# Patient Record
Sex: FEMALE | Race: WHITE | NOT HISPANIC OR LATINO | Employment: OTHER | URBAN - METROPOLITAN AREA
[De-identification: names, ages, dates, MRNs, and addresses within clinical notes are randomized per-mention and may not be internally consistent; named-entity substitution may affect disease eponyms.]

---

## 2015-03-02 LAB — BCS RECOMMENDATION EXT: NORMAL

## 2022-08-29 ENCOUNTER — HOSPITAL ENCOUNTER (EMERGENCY)
Facility: HOSPITAL | Age: 52
Discharge: HOME | End: 2022-08-29
Attending: EMERGENCY MEDICINE
Payer: MEDICARE

## 2022-08-29 VITALS
WEIGHT: 204 LBS | TEMPERATURE: 98 F | OXYGEN SATURATION: 97 % | BODY MASS INDEX: 30.92 KG/M2 | HEIGHT: 68 IN | RESPIRATION RATE: 16 BRPM | HEART RATE: 86 BPM | SYSTOLIC BLOOD PRESSURE: 145 MMHG | DIASTOLIC BLOOD PRESSURE: 86 MMHG

## 2022-08-29 DIAGNOSIS — G43.809 OTHER MIGRAINE WITHOUT STATUS MIGRAINOSUS, NOT INTRACTABLE: ICD-10-CM

## 2022-08-29 DIAGNOSIS — S16.1XXA CERVICAL STRAIN, ACUTE, INITIAL ENCOUNTER: ICD-10-CM
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
## 2022-08-29 PROCEDURE — 63600175 PHARM REV CODE 636 W HCPCS: Performed by: EMERGENCY MEDICINE

## 2022-08-29 PROCEDURE — 99284 EMERGENCY DEPT VISIT MOD MDM: CPT | Mod: 25

## 2022-08-29 PROCEDURE — 96374 THER/PROPH/DIAG INJ IV PUSH: CPT

## 2022-08-29 PROCEDURE — 25000003 PHARM REV CODE 250: Performed by: EMERGENCY MEDICINE

## 2022-08-29 PROCEDURE — 96375 TX/PRO/DX INJ NEW DRUG ADDON: CPT | Mod: 59

## 2022-08-29 PROCEDURE — 96361 HYDRATE IV INFUSION ADD-ON: CPT | Mod: 59

## 2022-08-29 RX ORDER — METOCLOPRAMIDE HYDROCHLORIDE 5 MG/ML
10 INJECTION INTRAMUSCULAR; INTRAVENOUS
Status: COMPLETED | OUTPATIENT
Start: 2022-08-29 | End: 2022-08-29

## 2022-08-29 RX ORDER — DIPHENHYDRAMINE HYDROCHLORIDE 50 MG/ML
25 INJECTION INTRAMUSCULAR; INTRAVENOUS
Status: COMPLETED | OUTPATIENT
Start: 2022-08-29 | End: 2022-08-29

## 2022-08-29 RX ORDER — KETOROLAC TROMETHAMINE 30 MG/ML
15 INJECTION, SOLUTION INTRAMUSCULAR; INTRAVENOUS
Status: COMPLETED | OUTPATIENT
Start: 2022-08-29 | End: 2022-08-29

## 2022-08-29 RX ADMIN — DIPHENHYDRAMINE HYDROCHLORIDE 25 MG: 50 INJECTION, SOLUTION INTRAMUSCULAR; INTRAVENOUS at 03:08

## 2022-08-29 RX ADMIN — KETOROLAC TROMETHAMINE 15 MG: 30 INJECTION, SOLUTION INTRAMUSCULAR; INTRAVENOUS at 03:08

## 2022-08-29 RX ADMIN — SODIUM CHLORIDE 1000 ML: 0.9 INJECTION, SOLUTION INTRAVENOUS at 03:08

## 2022-08-29 RX ADMIN — METOCLOPRAMIDE 10 MG: 5 INJECTION, SOLUTION INTRAMUSCULAR; INTRAVENOUS at 03:08
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
## 2022-09-17 ENCOUNTER — HOSPITAL ENCOUNTER (EMERGENCY)
Facility: HOSPITAL | Age: 52
Discharge: HOME | End: 2022-09-17
Attending: EMERGENCY MEDICINE
Payer: COMMERCIAL

## 2022-09-17 ENCOUNTER — NURSE TRIAGE (OUTPATIENT)
Dept: ADMINISTRATIVE | Facility: CLINIC | Age: 52
End: 2022-09-17
Payer: MEDICARE

## 2022-09-17 VITALS
RESPIRATION RATE: 18 BRPM | DIASTOLIC BLOOD PRESSURE: 92 MMHG | TEMPERATURE: 98 F | HEIGHT: 68 IN | WEIGHT: 200 LBS | OXYGEN SATURATION: 99 % | SYSTOLIC BLOOD PRESSURE: 175 MMHG | HEART RATE: 80 BPM | BODY MASS INDEX: 30.31 KG/M2

## 2022-09-17 DIAGNOSIS — M54.2 CERVICAL PAIN (NECK): Primary | ICD-10-CM

## 2022-09-17 PROCEDURE — 96372 THER/PROPH/DIAG INJ SC/IM: CPT | Performed by: NURSE PRACTITIONER

## 2022-09-17 PROCEDURE — 63600175 PHARM REV CODE 636 W HCPCS: Performed by: NURSE PRACTITIONER

## 2022-09-17 PROCEDURE — 99284 EMERGENCY DEPT VISIT MOD MDM: CPT | Mod: 25

## 2022-09-17 RX ORDER — KETOROLAC TROMETHAMINE 30 MG/ML
15 INJECTION, SOLUTION INTRAMUSCULAR; INTRAVENOUS
Status: COMPLETED | OUTPATIENT
Start: 2022-09-17 | End: 2022-09-17

## 2022-09-17 RX ORDER — CYCLOBENZAPRINE HCL 10 MG
10 TABLET ORAL 3 TIMES DAILY PRN
Qty: 15 TABLET | Refills: 0 | Status: SHIPPED | OUTPATIENT
Start: 2022-09-17 | End: 2022-09-22

## 2022-09-17 RX ORDER — SULINDAC 150 MG/1
150 TABLET ORAL 2 TIMES DAILY
Qty: 10 TABLET | Refills: 0 | Status: SHIPPED | OUTPATIENT
Start: 2022-09-17 | End: 2022-09-22

## 2022-09-17 RX ORDER — METHYLPREDNISOLONE SOD SUCC 125 MG
125 VIAL (EA) INJECTION
Status: COMPLETED | OUTPATIENT
Start: 2022-09-17 | End: 2022-09-17

## 2022-09-17 RX ADMIN — KETOROLAC TROMETHAMINE 15 MG: 30 INJECTION, SOLUTION INTRAMUSCULAR; INTRAVENOUS at 08:09

## 2022-09-17 RX ADMIN — METHYLPREDNISOLONE SODIUM SUCCINATE 125 MG: 125 INJECTION, POWDER, FOR SOLUTION INTRAMUSCULAR; INTRAVENOUS at 08:09

## 2022-09-18 ENCOUNTER — HOSPITAL ENCOUNTER (EMERGENCY)
Facility: HOSPITAL | Age: 52
Discharge: HOME | End: 2022-09-18
Attending: EMERGENCY MEDICINE
Payer: COMMERCIAL

## 2022-09-18 VITALS
DIASTOLIC BLOOD PRESSURE: 94 MMHG | RESPIRATION RATE: 18 BRPM | BODY MASS INDEX: 30.31 KG/M2 | SYSTOLIC BLOOD PRESSURE: 169 MMHG | HEART RATE: 91 BPM | WEIGHT: 200 LBS | TEMPERATURE: 98 F | HEIGHT: 68 IN | OXYGEN SATURATION: 98 %

## 2022-09-18 DIAGNOSIS — M62.838 NECK MUSCLE SPASM: Primary | ICD-10-CM

## 2022-09-18 DIAGNOSIS — M54.2 POSTERIOR NECK PAIN: ICD-10-CM

## 2022-09-18 DIAGNOSIS — S13.4XXA WHIPLASH INJURY TO NECK, INITIAL ENCOUNTER: ICD-10-CM

## 2022-09-18 PROCEDURE — 63600175 PHARM REV CODE 636 W HCPCS: Performed by: EMERGENCY MEDICINE

## 2022-09-18 PROCEDURE — 25000003 PHARM REV CODE 250: Performed by: EMERGENCY MEDICINE

## 2022-09-18 PROCEDURE — 99285 EMERGENCY DEPT VISIT HI MDM: CPT | Mod: 25

## 2022-09-18 PROCEDURE — 96372 THER/PROPH/DIAG INJ SC/IM: CPT | Performed by: EMERGENCY MEDICINE

## 2022-09-18 RX ORDER — DROPERIDOL 2.5 MG/ML
2.5 INJECTION, SOLUTION INTRAMUSCULAR; INTRAVENOUS
Status: COMPLETED | OUTPATIENT
Start: 2022-09-18 | End: 2022-09-18

## 2022-09-18 RX ORDER — KETOROLAC TROMETHAMINE 30 MG/ML
30 INJECTION, SOLUTION INTRAMUSCULAR; INTRAVENOUS
Status: COMPLETED | OUTPATIENT
Start: 2022-09-18 | End: 2022-09-18

## 2022-09-18 RX ORDER — DIAZEPAM 10 MG/2ML
5 INJECTION INTRAMUSCULAR
Status: COMPLETED | OUTPATIENT
Start: 2022-09-18 | End: 2022-09-18

## 2022-09-18 RX ORDER — METHOCARBAMOL 500 MG/1
1500 TABLET, FILM COATED ORAL
Status: COMPLETED | OUTPATIENT
Start: 2022-09-18 | End: 2022-09-18

## 2022-09-18 RX ORDER — DIPHENHYDRAMINE HYDROCHLORIDE 50 MG/ML
50 INJECTION INTRAMUSCULAR; INTRAVENOUS
Status: COMPLETED | OUTPATIENT
Start: 2022-09-18 | End: 2022-09-18

## 2022-09-18 RX ORDER — MORPHINE SULFATE 4 MG/ML
8 INJECTION, SOLUTION INTRAMUSCULAR; INTRAVENOUS
Status: COMPLETED | OUTPATIENT
Start: 2022-09-18 | End: 2022-09-18

## 2022-09-18 RX ORDER — CARISOPRODOL 350 MG/1
350 TABLET ORAL
Status: DISCONTINUED | OUTPATIENT
Start: 2022-09-18 | End: 2022-09-18

## 2022-09-18 RX ORDER — CARISOPRODOL 350 MG/1
350 TABLET ORAL 4 TIMES DAILY PRN
Qty: 20 TABLET | Refills: 0 | Status: SHIPPED | OUTPATIENT
Start: 2022-09-18 | End: 2022-09-26 | Stop reason: ALTCHOICE

## 2022-09-18 RX ADMIN — DIPHENHYDRAMINE HYDROCHLORIDE 50 MG: 50 INJECTION, SOLUTION INTRAMUSCULAR; INTRAVENOUS at 03:09

## 2022-09-18 RX ADMIN — DIAZEPAM 5 MG: 5 INJECTION, SOLUTION INTRAMUSCULAR; INTRAVENOUS at 02:09

## 2022-09-18 RX ADMIN — MORPHINE SULFATE 8 MG: 4 INJECTION INTRAVENOUS at 02:09

## 2022-09-18 RX ADMIN — DROPERIDOL 2.5 MG: 2.5 INJECTION, SOLUTION INTRAMUSCULAR; INTRAVENOUS at 04:09

## 2022-09-18 RX ADMIN — KETOROLAC TROMETHAMINE 30 MG: 30 INJECTION, SOLUTION INTRAMUSCULAR; INTRAVENOUS at 02:09

## 2022-09-18 RX ADMIN — METHOCARBAMOL 1500 MG: 500 TABLET ORAL at 04:09
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
## 2022-09-19 ENCOUNTER — TELEPHONE (OUTPATIENT)
Dept: NEUROSURGERY | Facility: CLINIC | Age: 52
End: 2022-09-19
Payer: MEDICARE
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
## 2022-09-21 ENCOUNTER — TELEPHONE (OUTPATIENT)
Dept: FAMILY MEDICINE | Facility: CLINIC | Age: 52
End: 2022-09-21
Payer: MEDICARE
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
## 2022-09-26 ENCOUNTER — OFFICE VISIT (OUTPATIENT)
Dept: FAMILY MEDICINE | Facility: CLINIC | Age: 52
End: 2022-09-26
Payer: MEDICARE

## 2022-09-26 VITALS
BODY MASS INDEX: 31 KG/M2 | SYSTOLIC BLOOD PRESSURE: 134 MMHG | WEIGHT: 204.56 LBS | HEIGHT: 68 IN | TEMPERATURE: 99 F | HEART RATE: 83 BPM | DIASTOLIC BLOOD PRESSURE: 80 MMHG | OXYGEN SATURATION: 98 %

## 2022-09-26 DIAGNOSIS — R73.03 PREDIABETES: ICD-10-CM
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
## 2022-09-26 DIAGNOSIS — Z78.0 MENOPAUSE PRESENT: ICD-10-CM

## 2022-09-26 DIAGNOSIS — Z00.00 ANNUAL PHYSICAL EXAM: Primary | ICD-10-CM

## 2022-09-26 DIAGNOSIS — S13.4XXS WHIPLASH INJURY TO NECK, SEQUELA: ICD-10-CM

## 2022-09-26 DIAGNOSIS — E66.9 OBESITY, UNSPECIFIED CLASSIFICATION, UNSPECIFIED OBESITY TYPE, UNSPECIFIED WHETHER SERIOUS COMORBIDITY PRESENT: ICD-10-CM

## 2022-09-26 PROBLEM — F41.9 ANXIETY: Status: ACTIVE | Noted: 2021-03-23

## 2022-09-26 PROBLEM — K50.90 CROHN'S DISEASE: Status: ACTIVE | Noted: 2021-02-01

## 2022-09-26 PROBLEM — G89.29 CHRONIC LOW BACK PAIN: Status: ACTIVE | Noted: 2021-04-27

## 2022-09-26 PROBLEM — M62.830 SPASM OF BACK MUSCLES: Status: ACTIVE | Noted: 2021-04-27

## 2022-09-26 PROBLEM — F51.01 PRIMARY INSOMNIA: Status: ACTIVE | Noted: 2021-02-01

## 2022-09-26 PROBLEM — G43.909 MIGRAINE: Status: ACTIVE | Noted: 2022-09-26

## 2022-09-26 PROBLEM — J45.909 ASTHMA: Status: ACTIVE | Noted: 2021-03-23

## 2022-09-26 PROBLEM — N39.3 FEMALE STRESS INCONTINENCE: Status: ACTIVE | Noted: 2021-02-01

## 2022-09-26 PROBLEM — K58.9 IRRITABLE BOWEL SYNDROME: Status: ACTIVE | Noted: 2021-04-27

## 2022-09-26 PROBLEM — E03.9 ACQUIRED HYPOTHYROIDISM: Status: ACTIVE | Noted: 2021-04-07

## 2022-09-26 PROBLEM — Z98.890 HISTORY OF CERVICAL DISCECTOMY: Status: ACTIVE | Noted: 2021-04-27

## 2022-09-26 PROBLEM — I10 HYPERTENSIVE DISORDER: Status: ACTIVE | Noted: 2021-03-23

## 2022-09-26 PROBLEM — Z98.1 HISTORY OF SPINAL FUSION: Status: ACTIVE | Noted: 2021-04-07

## 2022-09-26 PROBLEM — M54.50 CHRONIC LOW BACK PAIN: Status: ACTIVE | Noted: 2021-04-27

## 2022-09-26 PROCEDURE — 3079F PR MOST RECENT DIASTOLIC BLOOD PRESSURE 80-89 MM HG: ICD-10-PCS | Mod: CPTII,S$GLB,, | Performed by: FAMILY MEDICINE

## 2022-09-26 PROCEDURE — 99999 PR PBB SHADOW E&M-EST. PATIENT-LVL III: CPT | Mod: PBBFAC,,, | Performed by: FAMILY MEDICINE

## 2022-09-26 PROCEDURE — 99999 PR PBB SHADOW E&M-EST. PATIENT-LVL III: ICD-10-PCS | Mod: PBBFAC,,, | Performed by: FAMILY MEDICINE

## 2022-09-26 PROCEDURE — 3008F PR BODY MASS INDEX (BMI) DOCUMENTED: ICD-10-PCS | Mod: CPTII,S$GLB,, | Performed by: FAMILY MEDICINE

## 2022-09-26 PROCEDURE — 99386 PREV VISIT NEW AGE 40-64: CPT | Mod: S$GLB,,, | Performed by: FAMILY MEDICINE

## 2022-09-26 PROCEDURE — 1159F PR MEDICATION LIST DOCUMENTED IN MEDICAL RECORD: ICD-10-PCS | Mod: CPTII,S$GLB,, | Performed by: FAMILY MEDICINE

## 2022-09-26 PROCEDURE — 3075F SYST BP GE 130 - 139MM HG: CPT | Mod: CPTII,S$GLB,, | Performed by: FAMILY MEDICINE

## 2022-09-26 PROCEDURE — 1159F MED LIST DOCD IN RCRD: CPT | Mod: CPTII,S$GLB,, | Performed by: FAMILY MEDICINE

## 2022-09-26 PROCEDURE — 3008F BODY MASS INDEX DOCD: CPT | Mod: CPTII,S$GLB,, | Performed by: FAMILY MEDICINE

## 2022-09-26 PROCEDURE — 3075F PR MOST RECENT SYSTOLIC BLOOD PRESS GE 130-139MM HG: ICD-10-PCS | Mod: CPTII,S$GLB,, | Performed by: FAMILY MEDICINE

## 2022-09-26 PROCEDURE — 99386 PR PREVENTIVE VISIT,NEW,40-64: ICD-10-PCS | Mod: S$GLB,,, | Performed by: FAMILY MEDICINE

## 2022-09-26 PROCEDURE — 3079F DIAST BP 80-89 MM HG: CPT | Mod: CPTII,S$GLB,, | Performed by: FAMILY MEDICINE

## 2022-09-26 RX ORDER — TIRZEPATIDE 2.5 MG/.5ML
2.5 INJECTION, SOLUTION SUBCUTANEOUS
Qty: 4 PEN | Refills: 11 | Status: SHIPPED | OUTPATIENT
Start: 2022-09-26 | End: 2022-10-19

## 2022-09-26 RX ORDER — TIRZEPATIDE 2.5 MG/.5ML
2.5 INJECTION, SOLUTION SUBCUTANEOUS
Qty: 4 PEN | Refills: 11 | Status: SHIPPED | OUTPATIENT
Start: 2022-09-26 | End: 2022-09-26 | Stop reason: SDUPTHER

## 2022-09-26 RX ORDER — CARISOPRODOL 350 MG/1
TABLET ORAL
COMMUNITY
End: 2022-10-19 | Stop reason: SDUPTHER
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
## 2022-09-27 DIAGNOSIS — I10 HYPERTENSIVE DISORDER: ICD-10-CM

## 2022-10-11 ENCOUNTER — TELEPHONE (OUTPATIENT)
Dept: FAMILY MEDICINE | Facility: CLINIC | Age: 52
End: 2022-10-11
Payer: COMMERCIAL

## 2022-10-11 DIAGNOSIS — T81.31XS DEHISCENCE OF OPERATIVE WOUND, SEQUELA: Primary | ICD-10-CM

## 2022-10-11 NOTE — TELEPHONE ENCOUNTER
----- Message from Keyona Gretta sent at 10/11/2022 11:34 AM CDT -----  Regarding: self .[phone]  .Type:  Patient Requesting Referral    Who Called: self     Referral to What Specialty: Plastic Surgeon     Reason for Referral: infected open wound on  R breast   Does the patient want the referral with a specific physician?   Vinnie Aly MD, FACS  [address], [place] [address] [phone] Fax# [phone]    [facility] the specialist [facility]? [facility]     Would the patient rather a call back or a response via [facility]? Call back     Best Call Back Number: .[phone]      Additional Information:  Please send asap so pt can be seen today

## 2022-10-13 ENCOUNTER — TELEPHONE (OUTPATIENT)
Dept: FAMILY MEDICINE | Facility: CLINIC | Age: 52
End: 2022-10-13
Payer: COMMERCIAL

## 2022-10-13 NOTE — TELEPHONE ENCOUNTER
----- Message from Karley Gorman sent at 10/13/2022  2:40 PM CDT -----  .Type:  Patient Returning Call    Who Called: self    Who Left Message for Patient: teetee    Does the patient know what this is regarding?:referral    Would the patient rather a call back or a response via [facility]? call    Best Call Back Number:.[phone]    Pt states wrong referral was given

## 2022-10-13 NOTE — TELEPHONE ENCOUNTER
----- Message from Veronica Storm sent at 10/13/2022 11:59 AM CDT -----  Type: Patient Call Back    Who called:pt     What is the request in detail:pt requesting to get referral to see general surgery for open wound on her breasts. Call pt     Can the clinic reply by [facility]?    Would the patient rather a call back or a response via [facility]? call    Best call back number:[phone]  Additional Information:

## 2022-10-14 DIAGNOSIS — N61.1 BREAST ABSCESS: Primary | ICD-10-CM

## 2022-10-18 ENCOUNTER — TELEPHONE (OUTPATIENT)
Dept: FAMILY MEDICINE | Facility: CLINIC | Age: 52
End: 2022-10-18
Payer: COMMERCIAL

## 2022-10-18 NOTE — LETTER
October 18, 2022    Janneth Woods  [address]  [place] [address]             [id number] [facility] [address], [facility] [address]  [place] [address]  Phone: [phone]  Fax: [phone] Dear Ms. Woods:    Sorry we were unable to contact you to schedule your Plastic Surgery appointment. Please give the department a call at [phone].        If you have any questions or concerns, please don't hesitate to call.    Sincerely,        Joellen Howell,PSA

## 2022-10-19 ENCOUNTER — OFFICE VISIT (OUTPATIENT)
Dept: FAMILY MEDICINE | Facility: CLINIC | Age: 52
End: 2022-10-19
Payer: MEDICARE

## 2022-10-19 VITALS — WEIGHT: 200.63 LBS | BODY MASS INDEX: 31.49 KG/M2 | HEIGHT: 67 IN

## 2022-10-19 DIAGNOSIS — E66.9 OBESITY, UNSPECIFIED CLASSIFICATION, UNSPECIFIED OBESITY TYPE, UNSPECIFIED WHETHER SERIOUS COMORBIDITY PRESENT: ICD-10-CM

## 2022-10-19 DIAGNOSIS — F51.01 PRIMARY INSOMNIA: ICD-10-CM

## 2022-10-19 DIAGNOSIS — M54.50 CHRONIC BILATERAL LOW BACK PAIN, UNSPECIFIED WHETHER SCIATICA PRESENT: ICD-10-CM

## 2022-10-19 DIAGNOSIS — Z00.00 ANNUAL PHYSICAL EXAM: Primary | ICD-10-CM

## 2022-10-19 DIAGNOSIS — N61.1 BREAST ABSCESS: ICD-10-CM

## 2022-10-19 DIAGNOSIS — R79.9 ABNORMAL FINDING OF BLOOD CHEMISTRY: ICD-10-CM

## 2022-10-19 DIAGNOSIS — R93.89 ABNORMAL FINDINGS ON DIAGNOSTIC IMAGING OF OTHER SPECIFIED BODY STRUCTURES: ICD-10-CM

## 2022-10-19 DIAGNOSIS — G89.29 CHRONIC BILATERAL LOW BACK PAIN, UNSPECIFIED WHETHER SCIATICA PRESENT: ICD-10-CM

## 2022-10-19 DIAGNOSIS — R73.03 PREDIABETES: ICD-10-CM

## 2022-10-19 PROCEDURE — 3008F PR BODY MASS INDEX (BMI) DOCUMENTED: ICD-10-PCS | Mod: CPTII,S$GLB,, | Performed by: FAMILY MEDICINE

## 2022-10-19 PROCEDURE — 3044F PR MOST RECENT HEMOGLOBIN A1C LEVEL <7.0%: ICD-10-PCS | Mod: CPTII,S$GLB,, | Performed by: FAMILY MEDICINE

## 2022-10-19 PROCEDURE — 99999 PR PBB SHADOW E&M-EST. PATIENT-LVL II: ICD-10-PCS | Mod: PBBFAC,,, | Performed by: FAMILY MEDICINE

## 2022-10-19 PROCEDURE — 99999 PR PBB SHADOW E&M-EST. PATIENT-LVL II: CPT | Mod: PBBFAC,,, | Performed by: FAMILY MEDICINE

## 2022-10-19 PROCEDURE — 4010F ACE/ARB THERAPY RXD/TAKEN: CPT | Mod: CPTII,S$GLB,, | Performed by: FAMILY MEDICINE

## 2022-10-19 PROCEDURE — 3061F PR NEG MICROALBUMINURIA RESULT DOCUMENTED/REVIEW: ICD-10-PCS | Mod: CPTII,S$GLB,, | Performed by: FAMILY MEDICINE

## 2022-10-19 PROCEDURE — 4010F PR ACE/ARB THEARPY RXD/TAKEN: ICD-10-PCS | Mod: CPTII,S$GLB,, | Performed by: FAMILY MEDICINE

## 2022-10-19 PROCEDURE — 99214 OFFICE O/P EST MOD 30 MIN: CPT | Mod: S$GLB,,, | Performed by: FAMILY MEDICINE

## 2022-10-19 PROCEDURE — 3044F HG A1C LEVEL LT 7.0%: CPT | Mod: CPTII,S$GLB,, | Performed by: FAMILY MEDICINE

## 2022-10-19 PROCEDURE — 3066F PR DOCUMENTATION OF TREATMENT FOR NEPHROPATHY: ICD-10-PCS | Mod: CPTII,S$GLB,, | Performed by: FAMILY MEDICINE

## 2022-10-19 PROCEDURE — 3066F NEPHROPATHY DOC TX: CPT | Mod: CPTII,S$GLB,, | Performed by: FAMILY MEDICINE

## 2022-10-19 PROCEDURE — 99214 PR OFFICE/OUTPT VISIT, EST, LEVL IV, 30-39 MIN: ICD-10-PCS | Mod: S$GLB,,, | Performed by: FAMILY MEDICINE

## 2022-10-19 PROCEDURE — 3008F BODY MASS INDEX DOCD: CPT | Mod: CPTII,S$GLB,, | Performed by: FAMILY MEDICINE

## 2022-10-19 PROCEDURE — 3061F NEG MICROALBUMINURIA REV: CPT | Mod: CPTII,S$GLB,, | Performed by: FAMILY MEDICINE

## 2022-10-19 RX ORDER — TIRZEPATIDE 5 MG/.5ML
5 INJECTION, SOLUTION SUBCUTANEOUS
Qty: 4 PEN | Refills: 11 | Status: SHIPPED | OUTPATIENT
Start: 2022-10-19 | End: 2022-11-10 | Stop reason: RX

## 2022-10-19 RX ORDER — CARISOPRODOL 350 MG/1
TABLET ORAL
Qty: 20 TABLET | Refills: 0 | Status: SHIPPED | OUTPATIENT
Start: 2022-10-19 | End: 2022-12-15

## 2022-10-19 RX ORDER — ZOLPIDEM TARTRATE 12.5 MG/1
12.5 TABLET, FILM COATED, EXTENDED RELEASE ORAL NIGHTLY PRN
Qty: 30 TABLET | Refills: 5 | Status: SHIPPED | OUTPATIENT
Start: 2022-10-19 | End: 2023-04-19

## 2022-10-19 NOTE — PROGRESS NOTES
"HISTORY OF PRESENT ILLNESS:  Janneth Woods is a 52 y.o. female who presents to the clinic today for Follow-up  .       She is back on medication and doing better overall.  She is working on her weight as well.  Start on the GLP 1 shots    Patient Active Problem List   Diagnosis    Acquired hypothyroidism    Anxiety    Chronic low back pain    History of cervical discectomy    History of spinal fusion    Hypertensive disorder    Menopause present    Irritable bowel syndrome    Crohn's disease    Female stress incontinence    Asthma    Migraine    Spasm of back muscles    Obesity    Primary insomnia    Prediabetes           CARE TEAM:  Patient Care Team:  Merrill Cummins MD as PCP - General (Family Medicine)         ROS        PHYSICAL EXAM:   Ht 5' 7" (1.702 m)   Wt 91 kg (200 lb 9.9 oz)   BMI 31.42 kg/m²   BP Readings from Last 5 Encounters:   09/26/22 134/80   09/18/22 (!) 169/94   09/17/22 (!) 175/92   08/29/22 (!) 145/86     Wt Readings from Last 5 Encounters:   10/19/22 91 kg (200 lb 9.9 oz)   09/26/22 92.8 kg (204 lb 9.4 oz)   09/18/22 90.7 kg (200 lb)   09/17/22 90.7 kg (200 lb)   08/29/22 92.5 kg (204 lb)             She appears well, in no apparent distress.  Alert and oriented times three, pleasant and cooperative. Vital signs are as documented in vital signs section.       Medication List with Changes/Refills   New Medications    ESTRADIOL (ESTRACE) 1 MG TABLET    Take 1 tablet (1 mg total) by mouth once daily.    ESTROGENS, CONJUGATED, (PREMARIN) 0.3 MG TABLET    Take 1 tablet (0.3 mg total) by mouth once daily.    TIRZEPATIDE (MOUNJARO) 7.5 MG/0.5 ML PNIJ    Inject 7.5 mg into the skin every 7 days.    ZOLPIDEM (AMBIEN CR) 12.5 MG CR TABLET    Take 1 tablet (12.5 mg total) by mouth nightly as needed for Insomnia.   Current Medications    ESTROGENS, CONJUGATED, (PREMARIN) 0.625 MG TABLET    Take 1 tablet (0.625 mg total) by mouth once daily.   Changed and/or Refilled Medications    Modified Medication " Previous Medication    CARISOPRODOL (SOMA) 350 MG TABLET carisoprodoL (SOMA) 350 MG tablet       carisoprodol 350 mg tablet  TAKE ONE TABLET BY MOUTH ONE TIME DAILY, AS NEEDED FOR 30 DAYS Strength: 350 mg    carisoprodol 350 mg tablet   TAKE ONE TABLET BY MOUTH ONE TIME DAILY, AS NEEDED FOR 30 DAYS   Discontinued Medications    TIRZEPATIDE (MOUNJARO) 2.5 MG/0.5 ML PNIJ    Inject 2.5 mg into the skin every 7 days.    TIRZEPATIDE (MOUNJARO) 5 MG/0.5 ML PNIJ    Inject 5 mg into the skin every 7 days.         ASSESSMENT AND PLAN:    Problem List Items Addressed This Visit       Chronic low back pain    Relevant Medications    carisoprodoL (SOMA) 350 MG tablet    Other Relevant Orders    Ambulatory referral/consult to Pain Clinic    Obesity    Relevant Medications    estrogens, conjugated, (PREMARIN) 0.3 MG tablet    Primary insomnia    Relevant Medications    zolpidem (AMBIEN CR) 12.5 MG CR tablet    Prediabetes     Other Visit Diagnoses       Annual physical exam    -  Primary    Relevant Orders    C. trachomatis/N. gonorrhoeae by AMP DNA    Comprehensive Metabolic Panel    Breast abscess        Relevant Medications    estrogens, conjugated, (PREMARIN) 0.3 MG tablet    Abnormal finding of blood chemistry        Relevant Medications    estrogens, conjugated, (PREMARIN) 0.3 MG tablet    zolpidem (AMBIEN CR) 12.5 MG CR tablet    carisoprodoL (SOMA) 350 MG tablet    Other Relevant Orders    C. trachomatis/N. gonorrhoeae by AMP DNA    Comprehensive Metabolic Panel    Ambulatory referral/consult to Pain Clinic    Abnormal findings on diagnostic imaging of other specified body structures              The current medical regimen is effective;  continue present plan and medications.  Switch to long acting ambien  We will go from there      No future appointments.    No follow-ups on file. or sooner as needed.

## 2022-10-20 RX ORDER — ESTRADIOL 1 MG/1
1 TABLET ORAL DAILY
Qty: 30 TABLET | Refills: 11 | Status: SHIPPED | OUTPATIENT
Start: 2022-10-20 | End: 2023-05-10

## 2022-10-21 DIAGNOSIS — R79.9 ABNORMAL FINDING OF BLOOD CHEMISTRY: ICD-10-CM

## 2022-10-21 DIAGNOSIS — R73.03 PREDIABETES: ICD-10-CM

## 2022-10-25 ENCOUNTER — TELEPHONE (OUTPATIENT)
Dept: NEUROSURGERY | Facility: CLINIC | Age: 52
End: 2022-10-25
Payer: COMMERCIAL

## 2022-10-25 NOTE — TELEPHONE ENCOUNTER
Pt was trying to get on the schedule for  but does not have a recent MRI order in the system. I stated to pt that she will need to see his PA first before seeing him. Pt stated she prefers the [facility] clinic. I scheduled pt for Thursday 10/27 at 10:00 am with Eliz. Pt voiced understanding.

## 2022-10-26 ENCOUNTER — TELEPHONE (OUTPATIENT)
Dept: NEUROSURGERY | Facility: CLINIC | Age: 52
End: 2022-10-26
Payer: COMMERCIAL

## 2022-11-03 ENCOUNTER — PATIENT MESSAGE (OUTPATIENT)
Dept: ADMINISTRATIVE | Facility: HOSPITAL | Age: 52
End: 2022-11-03
Payer: COMMERCIAL

## 2022-11-03 DIAGNOSIS — Z12.11 SCREENING FOR COLON CANCER: ICD-10-CM

## 2022-11-10 RX ORDER — TIRZEPATIDE 7.5 MG/.5ML
7.5 INJECTION, SOLUTION SUBCUTANEOUS
Qty: 4 PEN | Refills: 11 | Status: SHIPPED | OUTPATIENT
Start: 2022-11-10 | End: 2023-01-09 | Stop reason: SDUPTHER

## 2022-12-15 ENCOUNTER — OFFICE VISIT (OUTPATIENT)
Dept: NEUROSURGERY | Facility: CLINIC | Age: 52
End: 2022-12-15
Payer: COMMERCIAL

## 2022-12-15 VITALS
BODY MASS INDEX: 29.83 KG/M2 | WEIGHT: 190.5 LBS | SYSTOLIC BLOOD PRESSURE: 148 MMHG | DIASTOLIC BLOOD PRESSURE: 82 MMHG | HEART RATE: 72 BPM

## 2022-12-15 DIAGNOSIS — M54.2 POSTERIOR NECK PAIN: ICD-10-CM

## 2022-12-15 DIAGNOSIS — Z98.1 S/P CERVICAL SPINAL FUSION: Primary | ICD-10-CM

## 2022-12-15 PROCEDURE — 1159F MED LIST DOCD IN RCRD: CPT | Mod: CPTII,S$GLB,, | Performed by: PHYSICIAN ASSISTANT

## 2022-12-15 PROCEDURE — 3008F BODY MASS INDEX DOCD: CPT | Mod: CPTII,S$GLB,, | Performed by: PHYSICIAN ASSISTANT

## 2022-12-15 PROCEDURE — 3079F PR MOST RECENT DIASTOLIC BLOOD PRESSURE 80-89 MM HG: ICD-10-PCS | Mod: CPTII,S$GLB,, | Performed by: PHYSICIAN ASSISTANT

## 2022-12-15 PROCEDURE — 1160F PR REVIEW ALL MEDS BY PRESCRIBER/CLIN PHARMACIST DOCUMENTED: ICD-10-PCS | Mod: CPTII,S$GLB,, | Performed by: PHYSICIAN ASSISTANT

## 2022-12-15 PROCEDURE — 99204 OFFICE O/P NEW MOD 45 MIN: CPT | Mod: S$GLB,,, | Performed by: PHYSICIAN ASSISTANT

## 2022-12-15 PROCEDURE — 3044F HG A1C LEVEL LT 7.0%: CPT | Mod: CPTII,S$GLB,, | Performed by: PHYSICIAN ASSISTANT

## 2022-12-15 PROCEDURE — 3077F SYST BP >= 140 MM HG: CPT | Mod: CPTII,S$GLB,, | Performed by: PHYSICIAN ASSISTANT

## 2022-12-15 PROCEDURE — 4010F ACE/ARB THERAPY RXD/TAKEN: CPT | Mod: CPTII,S$GLB,, | Performed by: PHYSICIAN ASSISTANT

## 2022-12-15 PROCEDURE — 3077F PR MOST RECENT SYSTOLIC BLOOD PRESSURE >= 140 MM HG: ICD-10-PCS | Mod: CPTII,S$GLB,, | Performed by: PHYSICIAN ASSISTANT

## 2022-12-15 PROCEDURE — 3044F PR MOST RECENT HEMOGLOBIN A1C LEVEL <7.0%: ICD-10-PCS | Mod: CPTII,S$GLB,, | Performed by: PHYSICIAN ASSISTANT

## 2022-12-15 PROCEDURE — 3079F DIAST BP 80-89 MM HG: CPT | Mod: CPTII,S$GLB,, | Performed by: PHYSICIAN ASSISTANT

## 2022-12-15 PROCEDURE — 99999 PR PBB SHADOW E&M-EST. PATIENT-LVL III: CPT | Mod: PBBFAC,,, | Performed by: PHYSICIAN ASSISTANT

## 2022-12-15 PROCEDURE — 3066F NEPHROPATHY DOC TX: CPT | Mod: CPTII,S$GLB,, | Performed by: PHYSICIAN ASSISTANT

## 2022-12-15 PROCEDURE — 99204 PR OFFICE/OUTPT VISIT, NEW, LEVL IV, 45-59 MIN: ICD-10-PCS | Mod: S$GLB,,, | Performed by: PHYSICIAN ASSISTANT

## 2022-12-15 PROCEDURE — 3061F NEG MICROALBUMINURIA REV: CPT | Mod: CPTII,S$GLB,, | Performed by: PHYSICIAN ASSISTANT

## 2022-12-15 PROCEDURE — 3008F PR BODY MASS INDEX (BMI) DOCUMENTED: ICD-10-PCS | Mod: CPTII,S$GLB,, | Performed by: PHYSICIAN ASSISTANT

## 2022-12-15 PROCEDURE — 99999 PR PBB SHADOW E&M-EST. PATIENT-LVL III: ICD-10-PCS | Mod: PBBFAC,,, | Performed by: PHYSICIAN ASSISTANT

## 2022-12-15 PROCEDURE — 3066F PR DOCUMENTATION OF TREATMENT FOR NEPHROPATHY: ICD-10-PCS | Mod: CPTII,S$GLB,, | Performed by: PHYSICIAN ASSISTANT

## 2022-12-15 PROCEDURE — 1159F PR MEDICATION LIST DOCUMENTED IN MEDICAL RECORD: ICD-10-PCS | Mod: CPTII,S$GLB,, | Performed by: PHYSICIAN ASSISTANT

## 2022-12-15 PROCEDURE — 3061F PR NEG MICROALBUMINURIA RESULT DOCUMENTED/REVIEW: ICD-10-PCS | Mod: CPTII,S$GLB,, | Performed by: PHYSICIAN ASSISTANT

## 2022-12-15 PROCEDURE — 1160F RVW MEDS BY RX/DR IN RCRD: CPT | Mod: CPTII,S$GLB,, | Performed by: PHYSICIAN ASSISTANT

## 2022-12-15 PROCEDURE — 4010F PR ACE/ARB THEARPY RXD/TAKEN: ICD-10-PCS | Mod: CPTII,S$GLB,, | Performed by: PHYSICIAN ASSISTANT
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
## 2022-12-21 ENCOUNTER — OFFICE VISIT (OUTPATIENT)
Dept: FAMILY MEDICINE | Facility: CLINIC | Age: 52
End: 2022-12-21
Payer: COMMERCIAL

## 2022-12-21 VITALS — BODY MASS INDEX: 29.1 KG/M2 | HEIGHT: 67 IN | WEIGHT: 185.44 LBS

## 2022-12-21 DIAGNOSIS — M79.644 FINGER PAIN, RIGHT: Primary | ICD-10-CM

## 2022-12-21 DIAGNOSIS — R73.03 PREDIABETES: ICD-10-CM

## 2022-12-21 DIAGNOSIS — R11.0 NAUSEA: ICD-10-CM

## 2022-12-21 DIAGNOSIS — K59.04 CHRONIC IDIOPATHIC CONSTIPATION: ICD-10-CM

## 2022-12-21 DIAGNOSIS — R79.9 ABNORMAL FINDING OF BLOOD CHEMISTRY: ICD-10-CM

## 2022-12-21 DIAGNOSIS — I10 HYPERTENSION, UNSPECIFIED TYPE: ICD-10-CM

## 2022-12-21 PROCEDURE — 1159F MED LIST DOCD IN RCRD: CPT | Mod: CPTII,S$GLB,, | Performed by: FAMILY MEDICINE

## 2022-12-21 PROCEDURE — 1160F RVW MEDS BY RX/DR IN RCRD: CPT | Mod: CPTII,S$GLB,, | Performed by: FAMILY MEDICINE

## 2022-12-21 PROCEDURE — 3061F PR NEG MICROALBUMINURIA RESULT DOCUMENTED/REVIEW: ICD-10-PCS | Mod: CPTII,S$GLB,, | Performed by: FAMILY MEDICINE

## 2022-12-21 PROCEDURE — 1159F PR MEDICATION LIST DOCUMENTED IN MEDICAL RECORD: ICD-10-PCS | Mod: CPTII,S$GLB,, | Performed by: FAMILY MEDICINE

## 2022-12-21 PROCEDURE — 99214 PR OFFICE/OUTPT VISIT, EST, LEVL IV, 30-39 MIN: ICD-10-PCS | Mod: S$GLB,,, | Performed by: FAMILY MEDICINE

## 2022-12-21 PROCEDURE — 1160F PR REVIEW ALL MEDS BY PRESCRIBER/CLIN PHARMACIST DOCUMENTED: ICD-10-PCS | Mod: CPTII,S$GLB,, | Performed by: FAMILY MEDICINE

## 2022-12-21 PROCEDURE — 3066F PR DOCUMENTATION OF TREATMENT FOR NEPHROPATHY: ICD-10-PCS | Mod: CPTII,S$GLB,, | Performed by: FAMILY MEDICINE

## 2022-12-21 PROCEDURE — 3008F BODY MASS INDEX DOCD: CPT | Mod: CPTII,S$GLB,, | Performed by: FAMILY MEDICINE

## 2022-12-21 PROCEDURE — 3008F PR BODY MASS INDEX (BMI) DOCUMENTED: ICD-10-PCS | Mod: CPTII,S$GLB,, | Performed by: FAMILY MEDICINE

## 2022-12-21 PROCEDURE — 3061F NEG MICROALBUMINURIA REV: CPT | Mod: CPTII,S$GLB,, | Performed by: FAMILY MEDICINE

## 2022-12-21 PROCEDURE — 3044F PR MOST RECENT HEMOGLOBIN A1C LEVEL <7.0%: ICD-10-PCS | Mod: CPTII,S$GLB,, | Performed by: FAMILY MEDICINE

## 2022-12-21 PROCEDURE — 4010F ACE/ARB THERAPY RXD/TAKEN: CPT | Mod: CPTII,S$GLB,, | Performed by: FAMILY MEDICINE

## 2022-12-21 PROCEDURE — 99214 OFFICE O/P EST MOD 30 MIN: CPT | Mod: S$GLB,,, | Performed by: FAMILY MEDICINE

## 2022-12-21 PROCEDURE — 4010F PR ACE/ARB THEARPY RXD/TAKEN: ICD-10-PCS | Mod: CPTII,S$GLB,, | Performed by: FAMILY MEDICINE

## 2022-12-21 PROCEDURE — 3044F HG A1C LEVEL LT 7.0%: CPT | Mod: CPTII,S$GLB,, | Performed by: FAMILY MEDICINE

## 2022-12-21 PROCEDURE — 99999 PR PBB SHADOW E&M-EST. PATIENT-LVL II: CPT | Mod: PBBFAC,,, | Performed by: FAMILY MEDICINE

## 2022-12-21 PROCEDURE — 99999 PR PBB SHADOW E&M-EST. PATIENT-LVL II: ICD-10-PCS | Mod: PBBFAC,,, | Performed by: FAMILY MEDICINE

## 2022-12-21 PROCEDURE — 3066F NEPHROPATHY DOC TX: CPT | Mod: CPTII,S$GLB,, | Performed by: FAMILY MEDICINE

## 2022-12-21 RX ORDER — CYANOCOBALAMIN 1000 UG/ML
1000 INJECTION, SOLUTION INTRAMUSCULAR; SUBCUTANEOUS WEEKLY
Qty: 10 ML | Refills: 1 | Status: SHIPPED | OUTPATIENT
Start: 2022-12-21 | End: 2023-05-10 | Stop reason: SDUPTHER

## 2022-12-21 RX ORDER — TIRZEPATIDE 10 MG/.5ML
10 INJECTION, SOLUTION SUBCUTANEOUS
Qty: 4 PEN | Refills: 11 | Status: SHIPPED | OUTPATIENT
Start: 2022-12-21 | End: 2023-05-10

## 2022-12-21 RX ORDER — VALACYCLOVIR HYDROCHLORIDE 500 MG/1
500 TABLET, FILM COATED ORAL EVERY 12 HOURS
Qty: 180 TABLET | Refills: 3 | Status: SHIPPED | OUTPATIENT
Start: 2022-12-21 | End: 2023-12-21

## 2022-12-21 RX ORDER — ONDANSETRON 8 MG/1
8 TABLET, ORALLY DISINTEGRATING ORAL 2 TIMES DAILY
Qty: 20 TABLET | Refills: 11 | Status: SHIPPED | OUTPATIENT
Start: 2022-12-21 | End: 2023-05-10 | Stop reason: SDUPTHER

## 2022-12-21 RX ORDER — LOSARTAN POTASSIUM 100 MG/1
100 TABLET ORAL DAILY
Qty: 90 TABLET | Refills: 3 | Status: SHIPPED | OUTPATIENT
Start: 2022-12-21 | End: 2023-09-26

## 2022-12-21 NOTE — PROGRESS NOTES
"HISTORY OF PRESENT ILLNESS:  Janneth Woods is a 52 y.o. female who presents to the clinic today for No chief complaint on file.  .       Medication refill and management.    She has elevated b/p at home.  Not on her losartan  Not on her b12  Taking mounjaro  More constipation but out of her linzess  And needs something for nausea    Patient Active Problem List   Diagnosis    Acquired hypothyroidism    Anxiety    Chronic low back pain    History of cervical discectomy    History of spinal fusion    Hypertensive disorder    Menopause present    Irritable bowel syndrome    Crohn's disease    Female stress incontinence    Asthma    Migraine    Spasm of back muscles    Obesity    Primary insomnia    Prediabetes           CARE TEAM:  Patient Care Team:  Merrill Cummins MD as PCP - General (Family Medicine)         ROS        PHYSICAL EXAM:   Ht 5' 7" (1.702 m)   Wt 84.1 kg (185 lb 6.5 oz)   BMI 29.04 kg/m²   BP Readings from Last 5 Encounters:   12/15/22 (!) 148/82   09/26/22 134/80   09/18/22 (!) 169/94   09/17/22 (!) 175/92   08/29/22 (!) 145/86     Wt Readings from Last 5 Encounters:   12/21/22 84.1 kg (185 lb 6.5 oz)   12/15/22 86.4 kg (190 lb 7.6 oz)   10/19/22 91 kg (200 lb 9.9 oz)   09/26/22 92.8 kg (204 lb 9.4 oz)   09/18/22 90.7 kg (200 lb)             She appears well, in no apparent distress.  Alert and oriented times three, pleasant and cooperative. Vital signs are as documented in vital signs section.  Losing weight  Doing well wit the mounjaro  Having a bit of nausea 1st day  Right ring finger hard nodule connelly side  Tendon vs. Bone  Get hte xray to see     Medication List with Changes/Refills   New Medications    CYANOCOBALAMIN 1,000 MCG/ML INJECTION    Inject 1 mL (1,000 mcg total) into the skin once a week.    LINACLOTIDE (LINZESS) 290 MCG CAP CAPSULE    Take 1 capsule (290 mcg total) by mouth before breakfast.    LOSARTAN (COZAAR) 100 MG TABLET    Take 1 tablet (100 mg total) by mouth once daily.    " ONDANSETRON (ZOFRAN-ODT) 8 MG TBDL    Take 1 tablet (8 mg total) by mouth 2 (two) times daily.    TIRZEPATIDE (MOUNJARO) 10 MG/0.5 ML PNIJ    Inject 10 mg into the skin every 7 days.    VALACYCLOVIR (VALTREX) 500 MG TABLET    Take 1 tablet (500 mg total) by mouth every 12 (twelve) hours.   Current Medications    ESTRADIOL (ESTRACE) 1 MG TABLET    Take 1 tablet (1 mg total) by mouth once daily.    TIRZEPATIDE (MOUNJARO) 7.5 MG/0.5 ML PNIJ    Inject 7.5 mg into the skin every 7 days.    ZOLPIDEM (AMBIEN CR) 12.5 MG CR TABLET    Take 1 tablet (12.5 mg total) by mouth nightly as needed for Insomnia.         ASSESSMENT AND PLAN:    Problem List Items Addressed This Visit       Hypertensive disorder    Relevant Medications    losartan (COZAAR) 100 MG tablet    Prediabetes    Relevant Medications    tirzepatide (MOUNJARO) 10 mg/0.5 mL PnIj     Other Visit Diagnoses       Finger pain, right    -  Primary    Relevant Orders    X-Ray Hand Complete Right    Chronic idiopathic constipation        Relevant Medications    linaCLOtide (LINZESS) 290 mcg Cap capsule    cyanocobalamin 1,000 mcg/mL injection    Nausea        Relevant Medications    ondansetron (ZOFRAN-ODT) 8 MG TbDL    tirzepatide (MOUNJARO) 10 mg/0.5 mL PnIj    cyanocobalamin 1,000 mcg/mL injection    Abnormal finding of blood chemistry        Relevant Medications    cyanocobalamin 1,000 mcg/mL injection              Future Appointments   Date Time Provider Department Center   12/22/2022 11:00 AM [facility] XR3 [facility] XRAY [facility]   12/22/2022 11:45 AM [facility] MRI1 LIMIT 350 LBS [facility] MRI [facility]   2/14/2023  8:40 AM Merrill Cummins MD [facility]       No follow-ups on file. or sooner as needed.

## 2022-12-22 DIAGNOSIS — E11.65 UNCONTROLLED TYPE 2 DIABETES MELLITUS WITH HYPERGLYCEMIA: Primary | ICD-10-CM

## 2022-12-23 ENCOUNTER — HOSPITAL ENCOUNTER (OUTPATIENT)
Dept: RADIOLOGY | Facility: HOSPITAL | Age: 52
Discharge: HOME | End: 2022-12-23
Attending: FAMILY MEDICINE
Payer: COMMERCIAL

## 2022-12-23 ENCOUNTER — HOSPITAL ENCOUNTER (OUTPATIENT)
Dept: RADIOLOGY | Facility: HOSPITAL | Age: 52
Discharge: HOME | End: 2022-12-23
Attending: PHYSICIAN ASSISTANT
Payer: COMMERCIAL

## 2022-12-23 DIAGNOSIS — M79.644 FINGER PAIN, RIGHT: ICD-10-CM

## 2022-12-23 DIAGNOSIS — Z98.1 S/P CERVICAL SPINAL FUSION: ICD-10-CM

## 2022-12-23 DIAGNOSIS — M54.2 POSTERIOR NECK PAIN: ICD-10-CM

## 2022-12-23 DIAGNOSIS — E11.65 UNCONTROLLED TYPE 2 DIABETES MELLITUS WITH HYPERGLYCEMIA: ICD-10-CM

## 2022-12-23 PROCEDURE — 72141 MRI NECK SPINE W/O DYE: CPT | Mod: TC

## 2022-12-23 PROCEDURE — 72050 X-RAY EXAM NECK SPINE 4/5VWS: CPT | Mod: 26,,, | Performed by: RADIOLOGY

## 2022-12-23 PROCEDURE — 72050 XR CERVICAL SPINE AP LAT WITH FLEX EXTEN: ICD-10-PCS | Mod: 26,,, | Performed by: RADIOLOGY

## 2022-12-23 PROCEDURE — 72050 X-RAY EXAM NECK SPINE 4/5VWS: CPT | Mod: TC,FY

## 2022-12-23 PROCEDURE — 73130 X-RAY EXAM OF HAND: CPT | Mod: TC,FY,RT

## 2022-12-23 PROCEDURE — 72141 MRI NECK SPINE W/O DYE: CPT | Mod: 26,,, | Performed by: RADIOLOGY

## 2022-12-23 PROCEDURE — 73130 X-RAY EXAM OF HAND: CPT | Mod: 26,RT,, | Performed by: RADIOLOGY

## 2022-12-23 PROCEDURE — 72141 MRI CERVICAL SPINE WITHOUT CONTRAST: ICD-10-PCS | Mod: 26,,, | Performed by: RADIOLOGY

## 2022-12-23 PROCEDURE — 73130 XR HAND COMPLETE 3 VIEW RIGHT: ICD-10-PCS | Mod: 26,RT,, | Performed by: RADIOLOGY

## 2022-12-26 ENCOUNTER — TELEPHONE (OUTPATIENT)
Dept: PHARMACY | Facility: CLINIC | Age: 52
End: 2022-12-26
Payer: COMMERCIAL

## 2022-12-28 ENCOUNTER — PATIENT MESSAGE (OUTPATIENT)
Dept: FAMILY MEDICINE | Facility: CLINIC | Age: 52
End: 2022-12-28
Payer: COMMERCIAL

## 2023-01-04 ENCOUNTER — PATIENT MESSAGE (OUTPATIENT)
Dept: NEUROSURGERY | Facility: CLINIC | Age: 53
End: 2023-01-04
Payer: COMMERCIAL

## 2023-01-04 DIAGNOSIS — M54.2 POSTERIOR NECK PAIN: ICD-10-CM

## 2023-01-04 DIAGNOSIS — Z98.1 S/P CERVICAL SPINAL FUSION: Primary | ICD-10-CM

## 2023-01-06 PROBLEM — E11.9 DIABETES MELLITUS, TYPE 2: Status: ACTIVE | Noted: 2022-09-26

## 2023-01-09 ENCOUNTER — TELEPHONE (OUTPATIENT)
Dept: PHARMACY | Facility: CLINIC | Age: 53
End: 2023-01-09
Payer: COMMERCIAL

## 2023-01-09 ENCOUNTER — TELEPHONE (OUTPATIENT)
Dept: FAMILY MEDICINE | Facility: CLINIC | Age: 53
End: 2023-01-09
Payer: COMMERCIAL

## 2023-01-09 DIAGNOSIS — R73.03 PREDIABETES: ICD-10-CM

## 2023-01-09 DIAGNOSIS — R79.9 ABNORMAL FINDING OF BLOOD CHEMISTRY: ICD-10-CM

## 2023-01-09 RX ORDER — TIRZEPATIDE 7.5 MG/.5ML
7.5 INJECTION, SOLUTION SUBCUTANEOUS
Qty: 12 PEN | Refills: 3 | Status: SHIPPED | OUTPATIENT
Start: 2023-01-09 | End: 2023-05-10

## 2023-01-30 DIAGNOSIS — R79.9 ABNORMAL FINDING OF BLOOD CHEMISTRY: ICD-10-CM

## 2023-01-30 DIAGNOSIS — R73.03 PREDIABETES: Primary | ICD-10-CM

## 2023-01-30 DIAGNOSIS — E11.9 TYPE 2 DIABETES MELLITUS WITHOUT COMPLICATION, WITHOUT LONG-TERM CURRENT USE OF INSULIN: Primary | ICD-10-CM

## 2023-01-30 RX ORDER — TIRZEPATIDE 12.5 MG/.5ML
12.5 INJECTION, SOLUTION SUBCUTANEOUS
Qty: 4 PEN | Refills: 11 | Status: SHIPPED | OUTPATIENT
Start: 2023-01-30 | End: 2023-05-10

## 2023-01-30 NOTE — TELEPHONE ENCOUNTER
Care Due:                  Date            Visit Type   Department     Provider  --------------------------------------------------------------------------------                                [facility]                              PRIMARY      MEDICINE/INTERN  Last Visit: 12-      CARE ([facility])   MIO FATIMA      [facility]/OF  MEDICINE/INTERN  Next Visit: 02-      FICE VISIT   AL MARIA DOLORES Cummins                                                            Last  Test          Frequency    Reason                     Performed    Due Date  --------------------------------------------------------------------------------    HBA1C.......  6 months...  semaglutide..............  10-   04-    [facility] Embedded Care Gaps. Reference number: [id number]. 1/30/2023   1:52:31 PM CST

## 2023-01-30 NOTE — TELEPHONE ENCOUNTER
Patient is unable to make scheduled appointment on Friday, says she is having to leave today to visit her father who is in the hospital. Pharmacy is unable to get mounjaro 10 mg in stock and she hasn't had any improvement on the 7.5 mg dose. She is requesting a printed prescription for the mounjaro 12.5 mg dosage. Pharmacy says they have in stock.

## 2023-02-13 ENCOUNTER — PATIENT OUTREACH (OUTPATIENT)
Dept: ADMINISTRATIVE | Facility: HOSPITAL | Age: 53
End: 2023-02-13
Payer: COMMERCIAL

## 2023-02-13 NOTE — PROGRESS NOTES
Health Maintenance Due   Topic Date Due    Cervical Cancer Screening  Never done    COVID-19 Vaccine (1) Never done    TETANUS VACCINE  Never done    Mammogram  Never done    Colorectal Cancer Screening  Never done    Shingles Vaccine (1 of 2) Never done    Influenza Vaccine (1) Never done     Chart review done. HM updated. Immunizations reviewed & updated. Care Everywhere updated.

## 2023-02-24 LAB — BCS RECOMMENDATION EXT: NORMAL

## 2023-03-17 ENCOUNTER — PATIENT MESSAGE (OUTPATIENT)
Dept: FAMILY MEDICINE | Facility: CLINIC | Age: 53
End: 2023-03-17
Payer: COMMERCIAL

## 2023-04-13 ENCOUNTER — PATIENT MESSAGE (OUTPATIENT)
Dept: ADMINISTRATIVE | Facility: HOSPITAL | Age: 53
End: 2023-04-13
Payer: COMMERCIAL

## 2023-04-25 DIAGNOSIS — R73.03 PREDIABETES: ICD-10-CM

## 2023-04-25 DIAGNOSIS — R11.0 NAUSEA: ICD-10-CM

## 2023-04-25 DIAGNOSIS — E11.9 TYPE 2 DIABETES MELLITUS WITHOUT COMPLICATION, WITHOUT LONG-TERM CURRENT USE OF INSULIN: ICD-10-CM

## 2023-04-25 RX ORDER — TIRZEPATIDE 12.5 MG/.5ML
12.5 INJECTION, SOLUTION SUBCUTANEOUS
Qty: 4 PEN | Refills: 11 | OUTPATIENT
Start: 2023-04-25

## 2023-04-25 NOTE — TELEPHONE ENCOUNTER
Patient requesting a refill of medication - Mounjaro.  Stated she has an appointment schedule for 7/3/2023, but took last injection today.  Please advise.

## 2023-04-25 NOTE — TELEPHONE ENCOUNTER
Care Due:                  Date            Visit Type   Department     Provider  --------------------------------------------------------------------------------                                [facility]                              PRIMARY      MEDICINE/INTERN  Last Visit: 12-      CARE ([facility])   MIO FATIMA      [facility]/OF  MEDICINE/INTERN  Next Visit: 07-      FICE VISIT   AL MARIA DOLORES Cummins                                                            Last  Test          Frequency    Reason                     Performed    Due Date  --------------------------------------------------------------------------------    HBA1C.......  6 months...  semaglutide..............  10-   04-    Health [facility] Embedded Care Gaps. Reference number: [id number]. 4/25/2023   10:35:41 AM CDT

## 2023-05-10 ENCOUNTER — OFFICE VISIT (OUTPATIENT)
Dept: FAMILY MEDICINE | Facility: CLINIC | Age: 53
End: 2023-05-10
Payer: COMMERCIAL

## 2023-05-10 VITALS
HEIGHT: 65 IN | SYSTOLIC BLOOD PRESSURE: 132 MMHG | TEMPERATURE: 99 F | BODY MASS INDEX: 27.92 KG/M2 | DIASTOLIC BLOOD PRESSURE: 80 MMHG | HEART RATE: 80 BPM | OXYGEN SATURATION: 98 % | WEIGHT: 167.56 LBS

## 2023-05-10 DIAGNOSIS — R11.0 NAUSEA: ICD-10-CM

## 2023-05-10 DIAGNOSIS — R79.9 ABNORMAL FINDING OF BLOOD CHEMISTRY: ICD-10-CM

## 2023-05-10 DIAGNOSIS — L29.9 PRURITUS: Primary | ICD-10-CM

## 2023-05-10 DIAGNOSIS — F51.01 PRIMARY INSOMNIA: ICD-10-CM

## 2023-05-10 DIAGNOSIS — K59.04 CHRONIC IDIOPATHIC CONSTIPATION: ICD-10-CM

## 2023-05-10 PROCEDURE — 99214 PR OFFICE/OUTPT VISIT, EST, LEVL IV, 30-39 MIN: ICD-10-PCS | Mod: S$GLB,,, | Performed by: FAMILY MEDICINE

## 2023-05-10 PROCEDURE — 1159F PR MEDICATION LIST DOCUMENTED IN MEDICAL RECORD: ICD-10-PCS | Mod: CPTII,S$GLB,, | Performed by: FAMILY MEDICINE

## 2023-05-10 PROCEDURE — 3079F DIAST BP 80-89 MM HG: CPT | Mod: CPTII,S$GLB,, | Performed by: FAMILY MEDICINE

## 2023-05-10 PROCEDURE — 3075F PR MOST RECENT SYSTOLIC BLOOD PRESS GE 130-139MM HG: ICD-10-PCS | Mod: CPTII,S$GLB,, | Performed by: FAMILY MEDICINE

## 2023-05-10 PROCEDURE — 99999 PR PBB SHADOW E&M-EST. PATIENT-LVL V: CPT | Mod: PBBFAC,,, | Performed by: FAMILY MEDICINE

## 2023-05-10 PROCEDURE — 3008F PR BODY MASS INDEX (BMI) DOCUMENTED: ICD-10-PCS | Mod: CPTII,S$GLB,, | Performed by: FAMILY MEDICINE

## 2023-05-10 PROCEDURE — 3075F SYST BP GE 130 - 139MM HG: CPT | Mod: CPTII,S$GLB,, | Performed by: FAMILY MEDICINE

## 2023-05-10 PROCEDURE — 99214 OFFICE O/P EST MOD 30 MIN: CPT | Mod: S$GLB,,, | Performed by: FAMILY MEDICINE

## 2023-05-10 PROCEDURE — 3079F PR MOST RECENT DIASTOLIC BLOOD PRESSURE 80-89 MM HG: ICD-10-PCS | Mod: CPTII,S$GLB,, | Performed by: FAMILY MEDICINE

## 2023-05-10 PROCEDURE — 3008F BODY MASS INDEX DOCD: CPT | Mod: CPTII,S$GLB,, | Performed by: FAMILY MEDICINE

## 2023-05-10 PROCEDURE — 4010F PR ACE/ARB THEARPY RXD/TAKEN: ICD-10-PCS | Mod: CPTII,S$GLB,, | Performed by: FAMILY MEDICINE

## 2023-05-10 PROCEDURE — 4010F ACE/ARB THERAPY RXD/TAKEN: CPT | Mod: CPTII,S$GLB,, | Performed by: FAMILY MEDICINE

## 2023-05-10 PROCEDURE — 1159F MED LIST DOCD IN RCRD: CPT | Mod: CPTII,S$GLB,, | Performed by: FAMILY MEDICINE

## 2023-05-10 PROCEDURE — 99999 PR PBB SHADOW E&M-EST. PATIENT-LVL V: ICD-10-PCS | Mod: PBBFAC,,, | Performed by: FAMILY MEDICINE

## 2023-05-10 RX ORDER — TEMAZEPAM 30 MG/1
30 CAPSULE ORAL NIGHTLY PRN
COMMUNITY
Start: 2023-03-16 | End: 2023-07-28 | Stop reason: ALTCHOICE

## 2023-05-10 RX ORDER — HYDROCORTISONE 25 MG/G
CREAM TOPICAL
COMMUNITY
Start: 2023-03-10 | End: 2023-09-26

## 2023-05-10 RX ORDER — IBUPROFEN 600 MG/1
600 TABLET ORAL EVERY 6 HOURS PRN
COMMUNITY
Start: 2023-03-10 | End: 2023-07-30 | Stop reason: ALTCHOICE

## 2023-05-10 RX ORDER — TOPIRAMATE 100 MG/1
TABLET, FILM COATED ORAL
COMMUNITY
End: 2023-07-28

## 2023-05-10 RX ORDER — HYDROXYZINE HYDROCHLORIDE 25 MG/1
25 TABLET, FILM COATED ORAL 4 TIMES DAILY PRN
COMMUNITY
Start: 2023-04-08 | End: 2023-05-10 | Stop reason: SDUPTHER

## 2023-05-10 RX ORDER — TRIAMCINOLONE ACETONIDE 1 MG/G
CREAM TOPICAL 2 TIMES DAILY
COMMUNITY
Start: 2023-04-08 | End: 2023-07-28 | Stop reason: ALTCHOICE

## 2023-05-10 RX ORDER — TIZANIDINE 4 MG/1
TABLET ORAL
COMMUNITY
Start: 2023-01-20 | End: 2023-05-10

## 2023-05-10 RX ORDER — ZOLPIDEM TARTRATE 12.5 MG/1
12.5 TABLET, FILM COATED, EXTENDED RELEASE ORAL NIGHTLY PRN
Qty: 30 TABLET | Refills: 2 | Status: SHIPPED | OUTPATIENT
Start: 2023-05-10 | End: 2023-07-30 | Stop reason: SDUPTHER

## 2023-05-10 RX ORDER — MODAFINIL 100 MG/1
100 TABLET ORAL
COMMUNITY
Start: 2023-03-16 | End: 2023-07-28 | Stop reason: ALTCHOICE

## 2023-05-10 RX ORDER — OXYCODONE HYDROCHLORIDE 15 MG/1
15 TABLET ORAL 4 TIMES DAILY PRN
COMMUNITY
Start: 2023-01-20 | End: 2023-05-10

## 2023-05-10 RX ORDER — ALPRAZOLAM 0.5 MG/1
TABLET ORAL
COMMUNITY
Start: 2022-11-30 | End: 2023-05-10

## 2023-05-10 RX ORDER — TIRZEPATIDE 5 MG/.5ML
INJECTION, SOLUTION SUBCUTANEOUS
COMMUNITY
Start: 2022-12-01 | End: 2023-05-10

## 2023-05-10 RX ORDER — DOCUSATE SODIUM 100 MG/1
100 CAPSULE, LIQUID FILLED ORAL 2 TIMES DAILY PRN
COMMUNITY
Start: 2022-12-06 | End: 2023-07-28 | Stop reason: ALTCHOICE

## 2023-05-10 RX ORDER — RIMEGEPANT SULFATE 75 MG/75MG
75 TABLET, ORALLY DISINTEGRATING ORAL DAILY PRN
COMMUNITY
Start: 2023-03-20

## 2023-05-10 RX ORDER — HYDROXYZINE HYDROCHLORIDE 25 MG/1
25 TABLET, FILM COATED ORAL 4 TIMES DAILY PRN
Qty: 120 TABLET | Refills: 0 | Status: SHIPPED | OUTPATIENT
Start: 2023-05-10 | End: 2023-07-28 | Stop reason: ALTCHOICE

## 2023-05-10 RX ORDER — PROMETHAZINE HYDROCHLORIDE 25 MG/1
25 TABLET ORAL EVERY 8 HOURS PRN
COMMUNITY
Start: 2023-03-16

## 2023-05-10 RX ORDER — OXYCODONE AND ACETAMINOPHEN 10; 325 MG/1; MG/1
1 TABLET ORAL 4 TIMES DAILY PRN
COMMUNITY
Start: 2023-04-17 | End: 2023-07-30 | Stop reason: ALTCHOICE

## 2023-05-10 RX ORDER — SPIRONOLACTONE 100 MG/1
100 TABLET, FILM COATED ORAL
COMMUNITY
Start: 2023-04-17 | End: 2023-07-28 | Stop reason: ALTCHOICE

## 2023-05-10 RX ORDER — ONDANSETRON 8 MG/1
8 TABLET, ORALLY DISINTEGRATING ORAL 2 TIMES DAILY
Qty: 20 TABLET | Refills: 11 | Status: SHIPPED | OUTPATIENT
Start: 2023-05-10

## 2023-05-10 RX ORDER — CARISOPRODOL 350 MG/1
350 TABLET ORAL 2 TIMES DAILY PRN
COMMUNITY
Start: 2023-04-17

## 2023-05-10 RX ORDER — PROGESTERONE 200 MG/1
200 CAPSULE ORAL NIGHTLY
COMMUNITY
Start: 2023-04-17 | End: 2023-07-28 | Stop reason: ALTCHOICE

## 2023-05-10 RX ORDER — CYANOCOBALAMIN 1000 UG/ML
1000 INJECTION, SOLUTION INTRAMUSCULAR; SUBCUTANEOUS WEEKLY
Qty: 10 ML | Refills: 1 | Status: SHIPPED | OUTPATIENT
Start: 2023-05-10

## 2023-05-10 NOTE — PROGRESS NOTES
"HISTORY OF PRESENT ILLNESS:  Janneth Woods is a 52 y.o. female who presents to the clinic today for Medication Refill  .       Medication refill  She is doing well  High risk medications are helping  No medication side effects noted.  She has had itching since her last surgery  No new exposures  No obvious rashes  Sleep is still a big issue  Patient Active Problem List   Diagnosis    Acquired hypothyroidism    Anxiety    Chronic low back pain    History of cervical discectomy    History of spinal fusion    Hypertensive disorder    Menopause present    Irritable bowel syndrome    Crohn's disease    Female stress incontinence    Asthma    Migraine    Spasm of back muscles    Obesity    Primary insomnia    Diabetes mellitus, type 2           CARE TEAM:  Patient Care Team:  Merrill Cummins MD as PCP - General (Family Medicine)         ROS        PHYSICAL EXAM:   /80   Pulse 80   Temp 98.5 °F (36.9 °C) (Oral)   Ht 5' 5" (1.651 m)   Wt 76 kg (167 lb 8.8 oz)   SpO2 98%   BMI 27.88 kg/m²   BP Readings from Last 5 Encounters:   05/10/23 132/80   12/15/22 (!) 148/82   09/26/22 134/80   09/18/22 (!) 169/94   09/17/22 (!) 175/92     Wt Readings from Last 5 Encounters:   05/10/23 76 kg (167 lb 8.8 oz)   12/21/22 84.1 kg (185 lb 6.5 oz)   12/15/22 86.4 kg (190 lb 7.6 oz)   10/19/22 91 kg (200 lb 9.9 oz)   09/26/22 92.8 kg (204 lb 9.4 oz)     Wt Readings from Last 15 Encounters:   05/10/23 76 kg (167 lb 8.8 oz)   12/21/22 84.1 kg (185 lb 6.5 oz)   12/15/22 86.4 kg (190 lb 7.6 oz)   10/19/22 91 kg (200 lb 9.9 oz)   09/26/22 92.8 kg (204 lb 9.4 oz)   09/18/22 90.7 kg (200 lb)   09/17/22 90.7 kg (200 lb)   08/29/22 92.5 kg (204 lb)             She appears well, in no apparent distress.  Alert and oriented times three, pleasant and cooperative. Vital signs are as documented in vital signs section.  S1 and S2 normal, no murmurs, clicks, gallops or rubs. Regular rate and rhythm. Chest is clear; no wheezes or rales. No edema " or JVD.  No obvious rashes noted.    Lab Results   Component Value Date    TSH 0.948 10/19/2022     Lab Results   Component Value Date    WBC 7.68 10/19/2022    HGB 14.7 10/19/2022    HCT 44.7 10/19/2022    MCV 94 10/19/2022     10/19/2022       Chemistry        Component Value Date/Time     10/19/2022 1205    K 4.1 10/19/2022 1205     10/19/2022 1205    CO2 23 10/19/2022 1205    BUN 12 10/19/2022 1205    CREATININE 0.8 10/19/2022 1205    GLU 84 10/19/2022 1205        Component Value Date/Time    CALCIUM 9.3 10/19/2022 1205    ALKPHOS 76 10/19/2022 1205    AST 19 10/19/2022 1205    ALT 14 10/19/2022 1205    BILITOT 0.6 10/19/2022 1205        Lab Results   Component Value Date    CHOL 206 (H) 10/19/2022     Lab Results   Component Value Date    HDL 57 10/19/2022     Lab Results   Component Value Date    LDLCALC 138.6 10/19/2022     Lab Results   Component Value Date    TRIG 52 10/19/2022     Lab Results   Component Value Date    CHOLHDL 27.7 10/19/2022          Medication List with Changes/Refills   New Medications    ZOLPIDEM (AMBIEN CR) 12.5 MG CR TABLET    Take 1 tablet (12.5 mg total) by mouth nightly as needed for Insomnia.   Current Medications    CARBAMAZEPINE (TEGRETOL) 200 MG TABLET    Take 3.5 tablets by mouth every 12 hours for 30 days.    CARISOPRODOL (SOMA) 350 MG TABLET    Take 350 mg by mouth 2 (two) times daily as needed.    DOCUSATE SODIUM (COLACE) 100 MG CAPSULE    Take 100 mg by mouth 2 (two) times daily as needed.    IBUPROFEN (ADVIL,MOTRIN) 600 MG TABLET    Take 600 mg by mouth every 6 (six) hours as needed.    LINACLOTIDE (LINZESS) 290 MCG CAP CAPSULE    Take 1 capsule (290 mcg total) by mouth daily before breakfast.    LOSARTAN (COZAAR) 100 MG TABLET    Take 1 tablet (100 mg total) by mouth once daily.    MODAFINIL (PROVIGIL) 100 MG TAB    Take 100 mg by mouth.    NURTEC 75 MG ODT    Take 75 mg by mouth daily as needed.    OXYCODONE-ACETAMINOPHEN (PERCOCET)  MG PER  TABLET    Take 1 tablet by mouth 4 (four) times daily as needed.    PROCTO-MED HC 2.5 % RECTAL CREAM    SMARTSIG:Topical    PROGESTERONE (PROMETRIUM) 200 MG CAPSULE    Take 200 mg by mouth every evening.    PROMETHAZINE (PHENERGAN) 25 MG TABLET    Take 25 mg by mouth every 8 (eight) hours as needed.    SPIRONOLACTONE (ALDACTONE) 100 MG TABLET    Take 100 mg by mouth.    TEMAZEPAM (RESTORIL) 30 MG CAPSULE    Take 30 mg by mouth nightly as needed.    TIRZEPATIDE 15 MG/0.5 ML PNIJ    Inject 15 mg into the skin every 7 days.    TOPIRAMATE (TOPAMAX) 100 MG TABLET    Take by mouth.    TRAZODONE (DESYREL) 100 MG TABLET    TAKE 2-3 TABLETS BY MOUTH AT BEDTIME    TRAZODONE (DESYREL) 150 MG TABLET    Take 2 tablets by mouth once daily at bedtime for 30 days.    TRIAMCINOLONE ACETONIDE 0.1% (KENALOG) 0.1 % CREAM    2 (two) times daily. Apply to affected area    VALACYCLOVIR (VALTREX) 500 MG TABLET    Take 1 tablet (500 mg total) by mouth every 12 (twelve) hours.    ZOLPIDEM (AMBIEN) 10 MG TAB    Take 1 tablet by mouth every day at bedtime for 30 days.   Changed and/or Refilled Medications    Modified Medication Previous Medication    CYANOCOBALAMIN 1,000 MCG/ML INJECTION cyanocobalamin 1,000 mcg/mL injection       Inject 1 mL (1,000 mcg total) into the skin once a week.    Inject 1 mL (1,000 mcg total) into the skin once a week.    HYDROXYZINE HCL (ATARAX) 25 MG TABLET hydrOXYzine HCL (ATARAX) 25 MG tablet       Take 1 tablet (25 mg total) by mouth 4 (four) times daily as needed for Itching.    Take 25 mg by mouth 4 (four) times daily as needed.    ONDANSETRON (ZOFRAN-ODT) 8 MG TBDL ondansetron (ZOFRAN-ODT) 8 MG TbDL       Dissolve 1 tablet (8 mg total) by mouth 2 (two) times daily.    Dissolve 1 tablet (8 mg total) by mouth 2 (two) times daily.   Discontinued Medications    ALPRAZOLAM (XANAX) 0.5 MG TABLET    Take by mouth.    AZITHROMYCIN (Z-ASHISH) 250 MG TABLET    Take 1 tablet daily by mouth for 5 days    ESTRADIOL (ESTRACE)  1 MG TABLET    Take 1 tablet (1 mg total) by mouth once daily.    FLUTICASONE PROPIONATE (FLONASE) 50 MCG/ACTUATION NASAL SPRAY    Use 1 spray in each nostril once daily    GUAIFENESIN 200 MG TABLET    Take 2 tablets every 4 (four) hours as needed by mouth for Congestion for up to 10 days    MOUNJARO 5 MG/0.5 ML PNIJ    SMARTSI Milligram(s) SUB-Q Once a Week    OXYCODONE (ROXICODONE) 15 MG TAB    Take 15 mg by mouth 4 (four) times daily as needed.    PRAZOSIN (MINIPRESS) 1 MG CAP    Take 1 capsule by mouth twice a day for 30 days.    PROMETHAZINE-DEXTROMETHORPHAN (PROMETHAZINE-DM) 6.25-15 MG/5 ML SYRP    Take 5 mLs by mouth 4 (four) times daily as needed for cough for up to 7 days    SEMAGLUTIDE (OZEMPIC) 1 MG/DOSE (4 MG/3 ML)    Inject 1 mg into the skin every 7 days.    TIRZEPATIDE (MOUNJARO) 10 MG/0.5 ML PNIJ    Inject 10 mg (one pen) into the skin every 7 days.    TIRZEPATIDE (MOUNJARO) 12.5 MG/0.5 ML PNIJ    Inject 12.5 mg into the skin every 7 days.    TIRZEPATIDE (MOUNJARO) 7.5 MG/0.5 ML PNIJ    Inject 7.5 mg into the skin every 7 days.    TIZANIDINE (ZANAFLEX) 4 MG TABLET    Take by mouth.         ASSESSMENT AND PLAN:    Problem List Items Addressed This Visit       Primary insomnia    Relevant Medications    zolpidem (AMBIEN CR) 12.5 MG CR tablet     Other Visit Diagnoses       Pruritus    -  Primary    Relevant Medications    hydrOXYzine HCL (ATARAX) 25 MG tablet    Chronic idiopathic constipation        Relevant Medications    docusate sodium (COLACE) 100 MG capsule    cyanocobalamin 1,000 mcg/mL injection    Nausea        Relevant Medications    cyanocobalamin 1,000 mcg/mL injection    ondansetron (ZOFRAN-ODT) 8 MG TbDL    Abnormal finding of blood chemistry        Relevant Medications    cyanocobalamin 1,000 mcg/mL injection          Potential medication side effects were discussed with the patient; let me know if any occur.      Future Appointments   Date Time Provider Department Center   2023   3:20 PM [facility]       Follow up in about 6 months (around 11/10/2023) for assess treatment plan. or sooner as needed.

## 2023-05-15 ENCOUNTER — TELEPHONE (OUTPATIENT)
Dept: NEUROSURGERY | Facility: CLINIC | Age: 53
End: 2023-05-15
Payer: COMMERCIAL

## 2023-06-19 ENCOUNTER — TELEPHONE (OUTPATIENT)
Dept: NEUROSURGERY | Facility: CLINIC | Age: 53
End: 2023-06-19
Payer: COMMERCIAL

## 2023-06-29 ENCOUNTER — HOSPITAL ENCOUNTER (EMERGENCY)
Facility: HOSPITAL | Age: 53
Discharge: HOME | End: 2023-06-29
Attending: INTERNAL MEDICINE
Payer: COMMERCIAL

## 2023-06-29 VITALS
OXYGEN SATURATION: 100 % | DIASTOLIC BLOOD PRESSURE: 72 MMHG | HEART RATE: 74 BPM | BODY MASS INDEX: 27.96 KG/M2 | SYSTOLIC BLOOD PRESSURE: 126 MMHG | TEMPERATURE: 98 F | WEIGHT: 168 LBS | RESPIRATION RATE: 15 BRPM

## 2023-06-29 DIAGNOSIS — K52.9 ENTERITIS: Primary | ICD-10-CM

## 2023-06-29 LAB
ALBUMIN SERPL BCP-MCNC: 3.6 G/DL (ref 3.5–5.2)
ALP SERPL-CCNC: 93 U/L (ref 55–135)
ALT SERPL W/O P-5'-P-CCNC: 11 U/L (ref 10–44)
ANION GAP SERPL CALC-SCNC: 8 MMOL/L (ref 8–16)
AST SERPL-CCNC: 15 U/L (ref 10–40)
BACTERIA #/AREA URNS HPF: ABNORMAL /HPF
BASOPHILS # BLD AUTO: 0.04 K/UL (ref 0–0.2)
BASOPHILS NFR BLD: 0.4 % (ref 0–1.9)
BILIRUB SERPL-MCNC: 0.2 MG/DL (ref 0.1–1)
BILIRUB UR QL STRIP: NEGATIVE
BUN SERPL-MCNC: 18 MG/DL (ref 6–20)
CALCIUM SERPL-MCNC: 8.5 MG/DL (ref 8.7–10.5)
CHLORIDE SERPL-SCNC: 110 MMOL/L (ref 95–110)
CLARITY UR: ABNORMAL
CO2 SERPL-SCNC: 20 MMOL/L (ref 23–29)
COLOR UR: YELLOW
CREAT SERPL-MCNC: 0.7 MG/DL (ref 0.5–1.4)
DIFFERENTIAL METHOD: ABNORMAL
EOSINOPHIL # BLD AUTO: 0.3 K/UL (ref 0–0.5)
EOSINOPHIL NFR BLD: 2.9 % (ref 0–8)
ERYTHROCYTE [DISTWIDTH] IN BLOOD BY AUTOMATED COUNT: 12.7 % (ref 11.5–14.5)
EST. GFR  (NO RACE VARIABLE): >60 ML/MIN/1.73 M^2
GLUCOSE SERPL-MCNC: 98 MG/DL (ref 70–110)
GLUCOSE UR QL STRIP: NEGATIVE
HCT VFR BLD AUTO: 38.5 % (ref 37–48.5)
HGB BLD-MCNC: 12.9 G/DL (ref 12–16)
HGB UR QL STRIP: NEGATIVE
HYALINE CASTS #/AREA URNS LPF: 7 /LPF
IMM GRANULOCYTES # BLD AUTO: 0.03 K/UL (ref 0–0.04)
IMM GRANULOCYTES NFR BLD AUTO: 0.3 % (ref 0–0.5)
KETONES UR QL STRIP: ABNORMAL
LACTATE SERPL-SCNC: 0.6 MMOL/L (ref 0.5–2.2)
LEUKOCYTE ESTERASE UR QL STRIP: ABNORMAL
LIPASE SERPL-CCNC: 19 U/L (ref 4–60)
LYMPHOCYTES # BLD AUTO: 1.9 K/UL (ref 1–4.8)
LYMPHOCYTES NFR BLD: 18.2 % (ref 18–48)
MCH RBC QN AUTO: 32.2 PG (ref 27–31)
MCHC RBC AUTO-ENTMCNC: 33.5 G/DL (ref 32–36)
MCV RBC AUTO: 96 FL (ref 82–98)
MICROSCOPIC COMMENT: ABNORMAL
MONOCYTES # BLD AUTO: 0.6 K/UL (ref 0.3–1)
MONOCYTES NFR BLD: 6.2 % (ref 4–15)
NEUTROPHILS # BLD AUTO: 7.4 K/UL (ref 1.8–7.7)
NEUTROPHILS NFR BLD: 72 % (ref 38–73)
NITRITE UR QL STRIP: NEGATIVE
NRBC BLD-RTO: 0 /100 WBC
PH UR STRIP: 6 [PH] (ref 5–8)
PLATELET # BLD AUTO: 258 K/UL (ref 150–450)
PMV BLD AUTO: 9.7 FL (ref 9.2–12.9)
POTASSIUM SERPL-SCNC: 4 MMOL/L (ref 3.5–5.1)
PROT SERPL-MCNC: 6.7 G/DL (ref 6–8.4)
PROT UR QL STRIP: ABNORMAL
RBC # BLD AUTO: 4.01 M/UL (ref 4–5.4)
RBC #/AREA URNS HPF: 23 /HPF (ref 0–4)
SODIUM SERPL-SCNC: 138 MMOL/L (ref 136–145)
SP GR UR STRIP: >1.03 (ref 1–1.03)
SQUAMOUS #/AREA URNS HPF: 7 /HPF
UNIDENT CRYS URNS QL MICRO: ABNORMAL
URN SPEC COLLECT METH UR: ABNORMAL
UROBILINOGEN UR STRIP-ACNC: ABNORMAL EU/DL
WBC # BLD AUTO: 10.26 K/UL (ref 3.9–12.7)
WBC #/AREA URNS HPF: 7 /HPF (ref 0–5)
WBC CLUMPS URNS QL MICRO: ABNORMAL

## 2023-06-29 PROCEDURE — 99285 EMERGENCY DEPT VISIT HI MDM: CPT | Mod: 25

## 2023-06-29 PROCEDURE — 63600175 PHARM REV CODE 636 W HCPCS: Performed by: INTERNAL MEDICINE

## 2023-06-29 PROCEDURE — 81000 URINALYSIS NONAUTO W/SCOPE: CPT | Performed by: EMERGENCY MEDICINE

## 2023-06-29 PROCEDURE — 96372 THER/PROPH/DIAG INJ SC/IM: CPT | Mod: 59 | Performed by: INTERNAL MEDICINE

## 2023-06-29 PROCEDURE — 85025 COMPLETE CBC W/AUTO DIFF WBC: CPT | Performed by: INTERNAL MEDICINE

## 2023-06-29 PROCEDURE — 25500020 PHARM REV CODE 255: Performed by: INTERNAL MEDICINE

## 2023-06-29 PROCEDURE — 83690 ASSAY OF LIPASE: CPT | Performed by: INTERNAL MEDICINE

## 2023-06-29 PROCEDURE — 96374 THER/PROPH/DIAG INJ IV PUSH: CPT | Mod: 59

## 2023-06-29 PROCEDURE — 83605 ASSAY OF LACTIC ACID: CPT | Performed by: INTERNAL MEDICINE

## 2023-06-29 PROCEDURE — 80053 COMPREHEN METABOLIC PANEL: CPT | Performed by: INTERNAL MEDICINE

## 2023-06-29 RX ORDER — DICYCLOMINE HYDROCHLORIDE 10 MG/ML
20 INJECTION INTRAMUSCULAR
Status: COMPLETED | OUTPATIENT
Start: 2023-06-29 | End: 2023-06-29

## 2023-06-29 RX ORDER — DICYCLOMINE HYDROCHLORIDE 10 MG/1
20 CAPSULE ORAL
Status: DISCONTINUED | OUTPATIENT
Start: 2023-06-29 | End: 2023-06-29

## 2023-06-29 RX ORDER — DICYCLOMINE HYDROCHLORIDE 20 MG/1
20 TABLET ORAL EVERY 12 HOURS PRN
Qty: 20 TABLET | Refills: 0 | Status: SHIPPED | OUTPATIENT
Start: 2023-06-29 | End: 2023-07-28 | Stop reason: ALTCHOICE

## 2023-06-29 RX ORDER — MORPHINE SULFATE 4 MG/ML
2 INJECTION, SOLUTION INTRAMUSCULAR; INTRAVENOUS
Status: COMPLETED | OUTPATIENT
Start: 2023-06-29 | End: 2023-06-29

## 2023-06-29 RX ADMIN — MORPHINE SULFATE 2 MG: 4 INJECTION INTRAVENOUS at 09:06

## 2023-06-29 RX ADMIN — IOHEXOL 70 ML: 350 INJECTION, SOLUTION INTRAVENOUS at 09:06

## 2023-06-29 RX ADMIN — DICYCLOMINE HYDROCHLORIDE 20 MG: 20 INJECTION, SOLUTION INTRAMUSCULAR at 07:06

## 2023-06-30 NOTE — ED TRIAGE NOTES
"Patient presents to the ED c/o lower abdominal pain and diarrhea x2 days. Pt states 7/10 pain that she describes as "period cramps" and has had no relief w/ Aleve. Pt is also c/o abdominal swelling and polyuria. Denies N/V, burning or difficulty w/ urination.   "

## 2023-06-30 NOTE — ED PROVIDER NOTES
"Encounter Date: 6/29/2023    SCRIBE #1 NOTE: I, MAYO SHELL, am scribing for, and in the presence of,  Pavel Morgan MD. I have scribed the following portions of the note - Other sections scribed: HPI, ROS, PE.     History     Chief Complaint   Patient presents with    Abdominal Pain     Pt c/o lower abdominal pain and diarrhea x2 days. Pt also states she has abdominal swelling x1 day. Pt also c/o polyuria x3 days. Pt rates pain 7/10. Pt took Aleve with no relief.     53-year-old female with a PMHx of HTN presents to the ED with a chief complaint of abdominal pain onset two days ago. Patient states that she has been having abdominal pain that feels like "period cramps". She further states that the pain radiates from the front to the back. She reports additional symptoms of abdominal distension that "looks like I'm pregnant", five to six episodes of watery diarrhea, and polyuria. She notes that she had a hysterectomy done twenty years ago. She further notes that she had a tap done three years ago secondary to polyuria. She denies any Hx of kidney issues. No other exacerbating or alleviating factors. Denies any nausea, emesis, hematochezia, or other associated symptoms.     The history is provided by the patient. No  was used.   Review of patient's allergies indicates:   Allergen Reactions    Amoxicillin Diarrhea    Ketorolac Blisters, Hives, Itching, Rash and Shortness Of Breath     Past Medical History:   Diagnosis Date    Hypertension      History reviewed. No pertinent surgical history.  History reviewed. No pertinent family history.  Social History     Tobacco Use    Smoking status: Never    Smokeless tobacco: Never   Substance Use Topics    Alcohol use: Never    Drug use: Never     Review of Systems   Constitutional:  Negative for fever.   HENT:  Negative for sore throat.    Respiratory:  Negative for shortness of breath.    Cardiovascular:  Negative for chest pain.   Gastrointestinal:  " Positive for abdominal distention, abdominal pain and diarrhea. Negative for blood in stool, nausea and vomiting.   Genitourinary:  Negative for dysuria.        (+) Polyuria.    Musculoskeletal:  Negative for back pain.   Skin:  Negative for rash.   Neurological:  Negative for weakness and headaches.   Psychiatric/Behavioral:  Negative for behavioral problems.    All other systems reviewed and are negative.    Physical Exam     Initial Vitals [06/29/23 1845]   BP Pulse Resp Temp SpO2   134/72 90 18 97.8 °F (36.6 °C) 95 %      MAP       --         Physical Exam    Nursing note and vitals reviewed.  Constitutional: She appears well-developed and well-nourished.   HENT:   Head: Normocephalic and atraumatic.   Eyes: Conjunctivae are normal.   Neck: Neck supple.   Normal range of motion.  Cardiovascular:  Normal rate, regular rhythm and normal heart sounds.     Exam reveals no gallop and no friction rub.       No murmur heard.  Pulmonary/Chest: Breath sounds normal. No respiratory distress. She has no wheezes. She has no rhonchi. She has no rales.   Abdominal: Abdomen is soft. Bowel sounds are normal.   Generalized abdominal TTP. No peritoneal signs.    Musculoskeletal:         General: No edema. Normal range of motion.      Cervical back: Normal range of motion and neck supple.     Neurological: She is alert and oriented to person, place, and time. GCS score is 15. GCS eye subscore is 4. GCS verbal subscore is 5. GCS motor subscore is 6.   Skin: Skin is warm and dry.   Psychiatric: She has a normal mood and affect.       ED Course   Procedures  Labs Reviewed   URINALYSIS, REFLEX TO URINE CULTURE - Abnormal; Notable for the following components:       Result Value    Appearance, UA Hazy (*)     Specific Gravity, UA >1.030 (*)     Protein, UA 1+ (*)     Ketones, UA Trace (*)     Urobilinogen, UA 4.0-6.0 (*)     Leukocytes, UA Trace (*)     All other components within normal limits    Narrative:     Specimen Source->Urine    CBC W/ AUTO DIFFERENTIAL - Abnormal; Notable for the following components:    MCH 32.2 (*)     All other components within normal limits   COMPREHENSIVE METABOLIC PANEL - Abnormal; Notable for the following components:    CO2 20 (*)     Calcium 8.5 (*)     All other components within normal limits   URINALYSIS MICROSCOPIC - Abnormal; Notable for the following components:    RBC, UA 23 (*)     WBC, UA 7 (*)     Hyaline Casts, UA 7 (*)     All other components within normal limits    Narrative:     Specimen Source->Urine   LIPASE   LACTIC ACID, PLASMA          Imaging Results              CT Abdomen Pelvis With Contrast (Final result)  Result time 06/29/23 21:46:42      Final result by Troy Duff MD (06/29/23 21:46:42)                   Impression:      1.  Fluid-filled loops of small bowel within the pelvis and liquid stool throughout the right hemicolon.  Findings may relate to a nonspecific enteritis/diarrheal illness.  Clinical correlation is advised.    2.  Mild decreased attenuation of the hepatic parenchyma suggesting hepatic steatosis.  Correlation with LFTs advised.    3.  Cholecystectomy, hysterectomy, and suspected appendectomy.    4.  Nonobstructing left renal calculus.    5.  Additional findings as above.      Electronically signed by: Troy Duff MD  Date:    06/29/2023  Time:    21:46               Narrative:    EXAMINATION:  CT ABDOMEN PELVIS WITH CONTRAST    CLINICAL HISTORY:  Abdominal pain, acute, nonlocalized;    TECHNIQUE:  Low dose axial images, sagittal and coronal reformations were obtained from the lung bases to the pubic symphysis following the IV administration of 70 mL of Omnipaque 350 .  Oral contrast was not given.    COMPARISON:  None.    FINDINGS:  There is a partially visualized left-sided breast implant present.  No confluent airspace consolidation or significant pleural fluid at the visualized lung bases.  The visualized portions of the heart appear normal.    The liver  is not significantly enlarged.  There is diffuse decreased attenuation of the hepatic parenchyma which may relate to hepatic steatosis.  No focal hepatic abnormality appreciated.  The gallbladder is surgically absent.  There is mild prominence of the extrahepatic common bile duct which may relate to post cholecystectomy status.    There are postsurgical changes of prior sleeve gastrectomy.  The spleen is not significantly enlarged.  There is a small splenule present.  The pancreas and adrenal glands appear within normal limits.    The kidneys are normal in size and location and enhance symmetrically.  There is no hydronephrosis.  There is a nonobstructing 0.3 cm left upper pole renal calculus.  The urinary bladder is unremarkable. The uterus appears to be surgically absent.  No significant free fluid in the pelvis.    The abdominal aorta is normal in course and caliber with mild atherosclerotic calcification along its course.  There is no retroperitoneal hematoma.    There are minimally prominent fluid-filled loops of small bowel within the pelvis.  No discrete transition point identified to suggest small bowel obstruction.  There is liquid stool throughout the right hemicolon.  The left hemicolon is relatively decompressed limiting evaluation.  Appendix appears to be surgically absent.  There is no ascites, portal venous gas, or free intraperitoneal air.    Osseous structures demonstrate postoperative changes of the lumbar spine.  There is patchy sclerosis of the right ilium which may relate to prior bone graft harvesting site.  The extraperitoneal soft tissues are unremarkable.                                       Medications   dicyclomine injection 20 mg (20 mg Intramuscular Given 6/29/23 1957)   morphine injection 2 mg (2 mg Intravenous Given 6/29/23 2115)   iohexoL (OMNIPAQUE 350) injection 70 mL (70 mLs Intravenous Given 6/29/23 2127)     Medical Decision Making:   History:   Old Medical Records: I decided to  "obtain old medical records.  Initial Assessment:   53-year-old female with a PMHx of HTN presents to the ED with a chief complaint of abdominal pain onset two days ago. Patient states that she has been having abdominal pain that feels like "period cramps". She further states that the pain radiates from the front to the back. She reports additional symptoms of abdominal distension that "looks like I'm pregnant", five to six episodes of watery diarrhea, and polyuria. She notes that she had a hysterectomy done twenty years ago. She further notes that she had a bladder tap done three years ago secondary to polyuria. She denies any Hx of kidney issues. No other exacerbating or alleviating factors. Denies any nausea, emesis, hematochezia, or other associated symptoms.   Clinical Tests:   Lab Tests: Ordered and Reviewed  Radiological Study: Ordered and Reviewed  ED Management:  CBC and CMP are reassuring.  Serum lactate was normal.  Patient was given Bentyl for abdominal cramping, but stated she needed something stronger for pain.  Morphine was given and perform CT of the abdomen/pelvis.  CT of the abdomen and pelvis showed no acute abnormalities, but revealed evidence of enteritis.  Patient was given instructions for enteritis and received a prescription for Bentyl.  She was advised to follow with her primary care physician within the next week for re-evaluation/return to the emergency department if condition worsens.        Scribe Attestation:   Scribe #1: I performed the above scribed service and the documentation accurately describes the services I performed. I attest to the accuracy of the note.              This document was produced by a scribe under my direction and in my presence. I agree with the content of the note and have made any necessary edits.     Dr. Morgan    06/30/2023 2:22 AM    Clinical Impression:   Final diagnoses:  [K52.9] Enteritis (Primary)        ED Disposition Condition    Discharge Stable      "     ED Prescriptions       Medication Sig Dispense Start Date End Date Auth. Provider    dicyclomine (BENTYL) 20 mg tablet Take 1 tablet (20 mg total) by mouth every 12 (twelve) hours as needed (Abdominal pain). 20 tablet 6/29/2023 7/29/2023 Pavel Morgan MD          Follow-up Information       Follow up With Specialties Details Why Contact Info    Merrill Cummins MD Family Medicine Schedule an appointment as soon as possible for a visit in 2 days For reevaluation [address] [facility]  [place] [address]  [phone]               Pavel Morgan MD  06/30/23 [id number]

## 2023-07-12 DIAGNOSIS — E11.9 TYPE 2 DIABETES MELLITUS WITHOUT COMPLICATION, WITHOUT LONG-TERM CURRENT USE OF INSULIN: ICD-10-CM

## 2023-07-12 RX ORDER — TIRZEPATIDE 12.5 MG/.5ML
12.5 INJECTION, SOLUTION SUBCUTANEOUS
Qty: 12 PEN | Refills: 3 | Status: SHIPPED | OUTPATIENT
Start: 2023-07-12 | End: 2023-09-26

## 2023-07-12 NOTE — TELEPHONE ENCOUNTER
No care due was identified.  [facility] Embedded Care Due Messages. Reference number: [id number].   7/12/2023 3:28:59 PM CDT

## 2023-07-28 ENCOUNTER — OFFICE VISIT (OUTPATIENT)
Dept: FAMILY MEDICINE | Facility: CLINIC | Age: 53
End: 2023-07-28
Payer: COMMERCIAL

## 2023-07-28 VITALS
TEMPERATURE: 98 F | WEIGHT: 158.75 LBS | SYSTOLIC BLOOD PRESSURE: 110 MMHG | BODY MASS INDEX: 24.06 KG/M2 | HEART RATE: 86 BPM | DIASTOLIC BLOOD PRESSURE: 74 MMHG | OXYGEN SATURATION: 98 % | HEIGHT: 68 IN

## 2023-07-28 DIAGNOSIS — F51.01 PRIMARY INSOMNIA: ICD-10-CM

## 2023-07-28 DIAGNOSIS — K52.9 ENTERITIS: Primary | ICD-10-CM

## 2023-07-28 DIAGNOSIS — K59.04 CHRONIC IDIOPATHIC CONSTIPATION: ICD-10-CM

## 2023-07-28 PROCEDURE — 4010F ACE/ARB THERAPY RXD/TAKEN: CPT | Mod: CPTII,S$GLB,, | Performed by: FAMILY MEDICINE

## 2023-07-28 PROCEDURE — 99999 PR PBB SHADOW E&M-EST. PATIENT-LVL III: ICD-10-PCS | Mod: PBBFAC,,, | Performed by: FAMILY MEDICINE

## 2023-07-28 PROCEDURE — 99999 PR PBB SHADOW E&M-EST. PATIENT-LVL III: CPT | Mod: PBBFAC,,, | Performed by: FAMILY MEDICINE

## 2023-07-28 PROCEDURE — 3008F PR BODY MASS INDEX (BMI) DOCUMENTED: ICD-10-PCS | Mod: CPTII,S$GLB,, | Performed by: FAMILY MEDICINE

## 2023-07-28 PROCEDURE — 3008F BODY MASS INDEX DOCD: CPT | Mod: CPTII,S$GLB,, | Performed by: FAMILY MEDICINE

## 2023-07-28 PROCEDURE — 99215 PR OFFICE/OUTPT VISIT, EST, LEVL V, 40-54 MIN: ICD-10-PCS | Mod: S$GLB,,, | Performed by: FAMILY MEDICINE

## 2023-07-28 PROCEDURE — 3078F PR MOST RECENT DIASTOLIC BLOOD PRESSURE < 80 MM HG: ICD-10-PCS | Mod: CPTII,S$GLB,, | Performed by: FAMILY MEDICINE

## 2023-07-28 PROCEDURE — 3074F SYST BP LT 130 MM HG: CPT | Mod: CPTII,S$GLB,, | Performed by: FAMILY MEDICINE

## 2023-07-28 PROCEDURE — 99215 OFFICE O/P EST HI 40 MIN: CPT | Mod: S$GLB,,, | Performed by: FAMILY MEDICINE

## 2023-07-28 PROCEDURE — 3078F DIAST BP <80 MM HG: CPT | Mod: CPTII,S$GLB,, | Performed by: FAMILY MEDICINE

## 2023-07-28 PROCEDURE — 4010F PR ACE/ARB THEARPY RXD/TAKEN: ICD-10-PCS | Mod: CPTII,S$GLB,, | Performed by: FAMILY MEDICINE

## 2023-07-28 PROCEDURE — 3074F PR MOST RECENT SYSTOLIC BLOOD PRESSURE < 130 MM HG: ICD-10-PCS | Mod: CPTII,S$GLB,, | Performed by: FAMILY MEDICINE

## 2023-07-28 RX ORDER — LUBIPROSTONE 24 UG/1
24 CAPSULE ORAL 2 TIMES DAILY
Qty: 90 CAPSULE | Refills: 3 | Status: SHIPPED | OUTPATIENT
Start: 2023-07-28

## 2023-07-28 RX ORDER — SYRING-NEEDL,DISP,INSUL,0.3 ML 29 G X1/2"
296 SYRINGE, EMPTY DISPOSABLE MISCELLANEOUS ONCE
Qty: 296 ML | Refills: 2 | Status: SHIPPED | OUTPATIENT
Start: 2023-07-28 | End: 2023-08-01

## 2023-07-28 RX ORDER — TIZANIDINE 4 MG/1
TABLET ORAL
Qty: 60 TABLET | Refills: 5 | Status: SHIPPED | OUTPATIENT
Start: 2023-07-28

## 2023-07-28 NOTE — PROGRESS NOTES
"HISTORY OF PRESENT ILLNESS:  Janneth Woods is a 53 y.o. female who presents to the clinic today for Follow-up  .       She is losing weight  Doing much better  Constipation is still an issue  Having lots of trouble with it every 10-11 days  Having to use medication.  No fever or chills noted.  She is doing well with her chronic condtitions.  She has multiple but managing well  Without new issues.    Patient Active Problem List   Diagnosis    Acquired hypothyroidism    Anxiety    Chronic low back pain    History of cervical discectomy    History of spinal fusion    Hypertensive disorder    Menopause present    Irritable bowel syndrome    Crohn's disease    Female stress incontinence    Asthma    Migraine    Spasm of back muscles    Obesity    Primary insomnia    Diabetes mellitus, type 2    Enteritis           CARE TEAM:  Patient Care Team:  Merrill Cummins MD as PCP - General (Family Medicine)         Review of Systems   HENT:  Positive for hearing loss.    Eyes:  Negative for discharge.   Respiratory:  Negative for wheezing.    Cardiovascular:  Negative for chest pain and palpitations.   Gastrointestinal:  Positive for constipation. Negative for blood in stool, diarrhea and vomiting.   Genitourinary:  Negative for dysuria and hematuria.   Musculoskeletal:  Positive for neck pain.   Neurological:  Positive for headaches. Negative for weakness.   Endo/Heme/Allergies:  Negative for polydipsia.           PHYSICAL EXAM:   /74   Pulse 86   Temp 98.4 °F (36.9 °C) (Oral)   Ht 5' 8" (1.727 m)   Wt 72 kg (158 lb 11.7 oz)   SpO2 98%   BMI 24.14 kg/m²   BP Readings from Last 5 Encounters:   07/28/23 110/74   06/29/23 126/72   05/10/23 132/80   12/15/22 (!) 148/82   09/26/22 134/80     Wt Readings from Last 5 Encounters:   07/28/23 72 kg (158 lb 11.7 oz)   06/29/23 76.2 kg (168 lb)   05/10/23 76 kg (167 lb 8.8 oz)   12/21/22 84.1 kg (185 lb 6.5 oz)   12/15/22 86.4 kg (190 lb 7.6 oz)             She appears well, in " no apparent distress.  Alert and oriented times three, pleasant and cooperative. Vital signs are as documented in vital signs section.  Weight loss noted.  S1 and S2 normal, no murmurs, clicks, gallops or rubs. Regular rate and rhythm. Chest is clear; no wheezes or rales. No edema or JVD.       Medication List with Changes/Refills   New Medications    LUBIPROSTONE (AMITIZA) 24 MCG CAP    Take 1 capsule (24 mcg total) by mouth 2 (two) times daily.   Current Medications    CARBAMAZEPINE (TEGRETOL) 200 MG TABLET    Take 3.5 tablets by mouth every 12 hours for 30 days.    CARISOPRODOL (SOMA) 350 MG TABLET    Take 350 mg by mouth 2 (two) times daily as needed.    CYANOCOBALAMIN 1,000 MCG/ML INJECTION    Inject 1 mL (1,000 mcg total) into the skin once a week.    IBUPROFEN (ADVIL,MOTRIN) 600 MG TABLET    Take 600 mg by mouth every 6 (six) hours as needed.    LINACLOTIDE (LINZESS) 290 MCG CAP CAPSULE    Take 1 capsule (290 mcg total) by mouth daily before breakfast.    LOSARTAN (COZAAR) 100 MG TABLET    Take 1 tablet (100 mg total) by mouth once daily.    NURTEC 75 MG ODT    Take 75 mg by mouth daily as needed.    ONDANSETRON (ZOFRAN-ODT) 8 MG TBDL    Dissolve 1 tablet (8 mg total) by mouth 2 (two) times daily.    OXYCODONE-ACETAMINOPHEN (PERCOCET)  MG PER TABLET    Take 1 tablet by mouth 4 (four) times daily as needed.    PROCTO-MED HC 2.5 % RECTAL CREAM    SMARTSIG:Topical    PROMETHAZINE (PHENERGAN) 25 MG TABLET    Take 25 mg by mouth every 8 (eight) hours as needed.    TIRZEPATIDE (MOUNJARO) 12.5 MG/0.5 ML PNIJ    Inject 12.5 mg into the skin every 7 days.    TRAZODONE (DESYREL) 100 MG TABLET    TAKE 2-3 TABLETS BY MOUTH AT BEDTIME    TRAZODONE (DESYREL) 150 MG TABLET    Take 2 tablets by mouth once daily at bedtime for 30 days.    VALACYCLOVIR (VALTREX) 500 MG TABLET    Take 1 tablet (500 mg total) by mouth every 12 (twelve) hours.    ZOLPIDEM (AMBIEN CR) 12.5 MG CR TABLET    Take 1 tablet (12.5 mg total) by  mouth nightly as needed for Insomnia.    ZOLPIDEM (AMBIEN) 10 MG TAB    Take 1 tablet by mouth every day at bedtime for 30 days.   Changed and/or Refilled Medications    Modified Medication Previous Medication    TIZANIDINE (ZANAFLEX) 4 MG TABLET tiZANidine (ZANAFLEX) 4 MG tablet       TAKE 1 TABLET BY MOUTH TWICE DAILY AS NEEDED    TAKE 1 TABLET BY MOUTH TWICE DAILY AS NEEDED FOR 30 DAYS   Discontinued Medications    DICYCLOMINE (BENTYL) 20 MG TABLET    Take 1 tablet (20 mg total) by mouth every 12 (twelve) hours as needed (Abdominal pain).    DOCUSATE SODIUM (COLACE) 100 MG CAPSULE    Take 100 mg by mouth 2 (two) times daily as needed.    HYDROXYZINE HCL (ATARAX) 25 MG TABLET    Take 1 tablet (25 mg total) by mouth 4 (four) times daily as needed for Itching.    MODAFINIL (PROVIGIL) 100 MG TAB    Take 100 mg by mouth.    MODAFINIL (PROVIGIL) 200 MG TAB    TAKE 1 TABLET BY MOUTH ONCE DAILY AS NEEDED FOR 30 DAYS    PROGESTERONE (PROMETRIUM) 200 MG CAPSULE    Take 200 mg by mouth every evening.    SPIRONOLACTONE (ALDACTONE) 100 MG TABLET    Take 100 mg by mouth.    TEMAZEPAM (RESTORIL) 30 MG CAPSULE    Take 30 mg by mouth nightly as needed.    TIRZEPATIDE 15 MG/0.5 ML PNIJ    Inject 15 mg into the skin every 7 days.    TOPIRAMATE (TOPAMAX) 100 MG TABLET    Take by mouth.    TRIAMCINOLONE ACETONIDE 0.1% (KENALOG) 0.1 % CREAM    2 (two) times daily. Apply to affected area    ZOLPIDEM (AMBIEN) 10 MG TAB    Take 1 tablet as needed by oral route at bedtime for 30 days.         ASSESSMENT AND PLAN:    Problem List Items Addressed This Visit       Enteritis - Primary     Reviewed her CT scan with her  She is doing well and better  Went over new regimen    She will continue mounjaro and start to phase to maintenance  From weight loss  She is going back to [place] for her other neurological care.  We will continue to monitor  F/u in 3 months for wellness  Get her mammogram    We had a prolonged discussion about these complex  clinical issues and went over the various important aspects to consider. All questions were answered.  Spent 42 (40-54) minutes on patient total including: preparing for patient/charting/ordering tests/counseling and education and care coordination. (use [id number] for each 15 minutes over 69 minutes or  for medicare).      No future appointments.    No follow-ups on file. or sooner as needed.

## 2023-07-29 ENCOUNTER — PATIENT MESSAGE (OUTPATIENT)
Dept: FAMILY MEDICINE | Facility: CLINIC | Age: 53
End: 2023-07-29
Payer: COMMERCIAL

## 2023-07-30 RX ORDER — ZOLPIDEM TARTRATE 12.5 MG/1
12.5 TABLET, FILM COATED, EXTENDED RELEASE ORAL NIGHTLY PRN
Qty: 30 TABLET | Refills: 2 | Status: SHIPPED | OUTPATIENT
Start: 2023-07-30 | End: 2023-12-28 | Stop reason: SDUPTHER

## 2023-08-01 ENCOUNTER — TELEPHONE (OUTPATIENT)
Dept: PHARMACY | Facility: CLINIC | Age: 53
End: 2023-08-01
Payer: COMMERCIAL

## 2023-08-28 DIAGNOSIS — E11.69 TYPE 2 DIABETES MELLITUS WITH OTHER SPECIFIED COMPLICATION, WITHOUT LONG-TERM CURRENT USE OF INSULIN: Primary | ICD-10-CM

## 2023-09-15 ENCOUNTER — PATIENT MESSAGE (OUTPATIENT)
Dept: FAMILY MEDICINE | Facility: CLINIC | Age: 53
End: 2023-09-15
Payer: COMMERCIAL

## 2023-09-25 ENCOUNTER — OFFICE VISIT (OUTPATIENT)
Dept: PAIN MEDICINE | Facility: CLINIC | Age: 53
End: 2023-09-25
Payer: COMMERCIAL

## 2023-09-25 ENCOUNTER — DOCUMENTATION ONLY (OUTPATIENT)
Dept: PAIN MEDICINE | Facility: CLINIC | Age: 53
End: 2023-09-25
Payer: COMMERCIAL

## 2023-09-25 VITALS
OXYGEN SATURATION: 99 % | WEIGHT: 155.19 LBS | BODY MASS INDEX: 23.6 KG/M2 | HEART RATE: 68 BPM | SYSTOLIC BLOOD PRESSURE: 136 MMHG | DIASTOLIC BLOOD PRESSURE: 78 MMHG | RESPIRATION RATE: 18 BRPM

## 2023-09-25 DIAGNOSIS — Z98.890 HISTORY OF CERVICAL DISCECTOMY: ICD-10-CM

## 2023-09-25 DIAGNOSIS — M47.812 CERVICAL SPONDYLOSIS: ICD-10-CM

## 2023-09-25 DIAGNOSIS — M50.30 DDD (DEGENERATIVE DISC DISEASE), CERVICAL: Primary | ICD-10-CM

## 2023-09-25 PROCEDURE — 99204 PR OFFICE/OUTPT VISIT, NEW, LEVL IV, 45-59 MIN: ICD-10-PCS | Mod: S$GLB,,, | Performed by: PAIN MEDICINE

## 2023-09-25 PROCEDURE — 3008F BODY MASS INDEX DOCD: CPT | Mod: CPTII,S$GLB,, | Performed by: PAIN MEDICINE

## 2023-09-25 PROCEDURE — 4010F ACE/ARB THERAPY RXD/TAKEN: CPT | Mod: CPTII,S$GLB,, | Performed by: PAIN MEDICINE

## 2023-09-25 PROCEDURE — 1159F PR MEDICATION LIST DOCUMENTED IN MEDICAL RECORD: ICD-10-PCS | Mod: CPTII,S$GLB,, | Performed by: PAIN MEDICINE

## 2023-09-25 PROCEDURE — 1160F RVW MEDS BY RX/DR IN RCRD: CPT | Mod: CPTII,S$GLB,, | Performed by: PAIN MEDICINE

## 2023-09-25 PROCEDURE — 99204 OFFICE O/P NEW MOD 45 MIN: CPT | Mod: S$GLB,,, | Performed by: PAIN MEDICINE

## 2023-09-25 PROCEDURE — 1160F PR REVIEW ALL MEDS BY PRESCRIBER/CLIN PHARMACIST DOCUMENTED: ICD-10-PCS | Mod: CPTII,S$GLB,, | Performed by: PAIN MEDICINE

## 2023-09-25 PROCEDURE — 3008F PR BODY MASS INDEX (BMI) DOCUMENTED: ICD-10-PCS | Mod: CPTII,S$GLB,, | Performed by: PAIN MEDICINE

## 2023-09-25 PROCEDURE — 3078F PR MOST RECENT DIASTOLIC BLOOD PRESSURE < 80 MM HG: ICD-10-PCS | Mod: CPTII,S$GLB,, | Performed by: PAIN MEDICINE

## 2023-09-25 PROCEDURE — 4010F PR ACE/ARB THEARPY RXD/TAKEN: ICD-10-PCS | Mod: CPTII,S$GLB,, | Performed by: PAIN MEDICINE

## 2023-09-25 PROCEDURE — 99999 PR PBB SHADOW E&M-EST. PATIENT-LVL IV: CPT | Mod: PBBFAC,,, | Performed by: PAIN MEDICINE

## 2023-09-25 PROCEDURE — 3075F PR MOST RECENT SYSTOLIC BLOOD PRESS GE 130-139MM HG: ICD-10-PCS | Mod: CPTII,S$GLB,, | Performed by: PAIN MEDICINE

## 2023-09-25 PROCEDURE — 1159F MED LIST DOCD IN RCRD: CPT | Mod: CPTII,S$GLB,, | Performed by: PAIN MEDICINE

## 2023-09-25 PROCEDURE — 3078F DIAST BP <80 MM HG: CPT | Mod: CPTII,S$GLB,, | Performed by: PAIN MEDICINE

## 2023-09-25 PROCEDURE — 99999 PR PBB SHADOW E&M-EST. PATIENT-LVL IV: ICD-10-PCS | Mod: PBBFAC,,, | Performed by: PAIN MEDICINE

## 2023-09-25 PROCEDURE — 3075F SYST BP GE 130 - 139MM HG: CPT | Mod: CPTII,S$GLB,, | Performed by: PAIN MEDICINE
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
## 2023-09-26 PROBLEM — M50.30 DDD (DEGENERATIVE DISC DISEASE), CERVICAL: Status: ACTIVE | Noted: 2023-09-26

## 2023-09-26 PROBLEM — M47.812 CERVICAL SPONDYLOSIS: Status: ACTIVE | Noted: 2023-09-26

## 2023-10-10 ENCOUNTER — PATIENT MESSAGE (OUTPATIENT)
Dept: ADMINISTRATIVE | Facility: HOSPITAL | Age: 53
End: 2023-10-10
Payer: COMMERCIAL

## 2023-10-17 ENCOUNTER — PATIENT MESSAGE (OUTPATIENT)
Dept: ADMINISTRATIVE | Facility: HOSPITAL | Age: 53
End: 2023-10-17
Payer: COMMERCIAL

## 2023-10-19 ENCOUNTER — PATIENT OUTREACH (OUTPATIENT)
Dept: ADMINISTRATIVE | Facility: HOSPITAL | Age: 53
End: 2023-10-19
Payer: COMMERCIAL

## 2023-10-19 NOTE — PROGRESS NOTES
Patient stated had mammogram at [facility] in [facility].  I sent KIMO to fax number [phone]

## 2023-10-19 NOTE — LETTER
AUTHORIZATION FOR RELEASE OF   CONFIDENTIAL INFORMATION    Dear [facility]     We are seeing Janneth Woods, date of birth 1970, in the clinic at [facility] FAMILY MEDICINE/INTERNAL MED. Merrill Cummins MD is the patient's PCP. Janneth Woods has an outstanding lab/procedure at the time we reviewed her chart. In order to help keep her health information updated, she has authorized us to request the following medical record(s):        ( x )  MAMMOGRAM                                      (  )  COLONOSCOPY      (  )  PAP SMEAR                                          (  )  OUTSIDE LAB RESULTS     (  )  DEXA SCAN                                          (  )  EYE EXAM            (  )  FOOT EXAM                                          (  )  ENTIRE RECORD     (  )  OUTSIDE IMMUNIZATIONS                 (  )  _______________         Please fax records to [facility], Rodi, Jake J., MD,                         FAX # [phone]  Any questions please contact Moon Watson at [phone] .           Patient Name: Janneth Woods  : 1970  Patient Phone #: [phone]

## 2023-10-23 ENCOUNTER — PATIENT OUTREACH (OUTPATIENT)
Dept: ADMINISTRATIVE | Facility: HOSPITAL | Age: 53
End: 2023-10-23
Payer: COMMERCIAL

## 2023-10-23 ENCOUNTER — PATIENT MESSAGE (OUTPATIENT)
Dept: FAMILY MEDICINE | Facility: CLINIC | Age: 53
End: 2023-10-23
Payer: COMMERCIAL

## 2023-10-26 ENCOUNTER — PATIENT MESSAGE (OUTPATIENT)
Dept: FAMILY MEDICINE | Facility: CLINIC | Age: 53
End: 2023-10-26

## 2023-10-26 ENCOUNTER — OFFICE VISIT (OUTPATIENT)
Dept: FAMILY MEDICINE | Facility: CLINIC | Age: 53
End: 2023-10-26
Payer: COMMERCIAL

## 2023-10-26 DIAGNOSIS — T40.2X5A THERAPEUTIC OPIOID-INDUCED CONSTIPATION (OIC): Primary | ICD-10-CM

## 2023-10-26 DIAGNOSIS — K59.03 THERAPEUTIC OPIOID-INDUCED CONSTIPATION (OIC): Primary | ICD-10-CM

## 2023-10-26 PROCEDURE — 4010F PR ACE/ARB THEARPY RXD/TAKEN: ICD-10-PCS | Mod: CPTII,95,, | Performed by: FAMILY MEDICINE

## 2023-10-26 PROCEDURE — 99214 PR OFFICE/OUTPT VISIT, EST, LEVL IV, 30-39 MIN: ICD-10-PCS | Mod: 95,,, | Performed by: FAMILY MEDICINE

## 2023-10-26 PROCEDURE — 4010F ACE/ARB THERAPY RXD/TAKEN: CPT | Mod: CPTII,95,, | Performed by: FAMILY MEDICINE

## 2023-10-26 PROCEDURE — 99214 OFFICE O/P EST MOD 30 MIN: CPT | Mod: 95,,, | Performed by: FAMILY MEDICINE

## 2023-10-26 RX ORDER — DICYCLOMINE HYDROCHLORIDE 20 MG/1
20 TABLET ORAL
Qty: 120 TABLET | Refills: 0 | Status: SHIPPED | OUTPATIENT
Start: 2023-10-26 | End: 2023-12-01

## 2023-10-26 RX ORDER — NALOXEGOL OXALATE 25 MG/1
25 TABLET, FILM COATED ORAL DAILY
Qty: 30 TABLET | Refills: 0 | Status: SHIPPED | OUTPATIENT
Start: 2023-10-26

## 2023-10-26 NOTE — PROGRESS NOTES
HISTORY OF PRESENT ILLNESS:  Janneth Woods is a 53 y.o. female who presents to the clinic today for No chief complaint on file.  .      The patient location is: [place]  The chief complaint leading to consultation is: medication change and refill    Visit type: audiovisual    Face to Face time with patient: 10  30 minutes of total time spent on the encounter, which includes face to face time and non-face to face time preparing to see the patient (eg, review of tests), Obtaining and/or reviewing separately obtained history, Documenting clinical information in the electronic or other health record, Independently interpreting results (not separately reported) and communicating results to the patient/family/caregiver, or Care coordination (not separately reported).         Each patient to whom he or she provides medical services by telemedicine is:  (1) informed of the relationship between the physician and patient and the respective role of any other health care provider with respect to management of the patient; and (2) notified that he or she may decline to receive medical services by telemedicine and may withdraw from such care at any time.    Notes:   She is struggling with the gut.  She had this prior to glp1RA  She is scaling back the mounjaro but still not a big change in the gi issues.  She is  managing on a lot of medications.  Answers submitted by the patient for this visit:  Abdominal Pain Questionnaire (Submitted on 10/24/2023)  Chief Complaint: Abdominal pain  Chronicity: recurrent  Onset: more than 1 month ago  Onset quality: gradual  Frequency: daily  Progression since onset: gradually worsening  Pain location: generalized abdominal region  Pain - numeric: 5/10  Pain quality: cramping  anorexia: No  arthralgias: Yes  belching: Yes  flatus: Yes  hematochezia: Yes  Aggravated by: being still  Relieved by: bowel movements  Pain severity: moderate  Treatments tried: acetaminophen, antacids  Improvement on  treatment: no relief  abdominal surgery: Yes  Crohn's disease: Yes  gallstones: Yes  GERD: Yes  irritable bowel syndrome: Yes  kidney stones: Yes  pancreatitis: No  PUD: No  ulcerative colitis: No  UTI: No    Patient Active Problem List   Diagnosis    Acquired hypothyroidism    Anxiety    Chronic low back pain    History of cervical discectomy    History of spinal fusion    Hypertensive disorder    Menopause present    Irritable bowel syndrome    Crohn's disease    Female stress incontinence    Asthma    Migraine    Spasm of back muscles    Obesity    Primary insomnia    Diabetes mellitus, type 2    Enteritis    Cervical spondylosis    DDD (degenerative disc disease), cervical           CARE TEAM:  Patient Care Team:  Merrill Cummins MD as PCP - General (Family Medicine)  Kacie Hammond LPN as Licensed Practical Nurse         Review of Systems   Gastrointestinal:  Positive for abdominal pain, constipation, nausea and vomiting.   Musculoskeletal:  Positive for myalgias.   Neurological:  Positive for headaches.           PHYSICAL EXAM:   There were no vitals taken for this visit.  BP Readings from Last 5 Encounters:   09/25/23 136/78   07/28/23 110/74   06/29/23 126/72   05/10/23 132/80   12/15/22 (!) 148/82     Wt Readings from Last 5 Encounters:   09/25/23 70.4 kg (155 lb 3.3 oz)   07/28/23 72 kg (158 lb 11.7 oz)   06/29/23 76.2 kg (168 lb)   05/10/23 76 kg (167 lb 8.8 oz)   12/21/22 84.1 kg (185 lb 6.5 oz)             She appears well, in no apparent distress.  Alert and oriented times three, pleasant and cooperative. Vital signs are as documented in vital signs section.  Still with stomach issues  She is taking soma for the cramps in the      Medication List with Changes/Refills   New Medications    DICYCLOMINE (BENTYL) 20 MG TABLET    Take 1 tablet (20 mg total) by mouth 4 (four) times daily before meals and nightly.    NALOXEGOL (MOVANTIK) 25 MG TABLET    Take 1 tablet (25 mg) by mouth once daily.   Current  Medications    CARBAMAZEPINE (TEGRETOL) 200 MG TABLET    Take 3.5 tablets by mouth every 12 hours for 30 days.    CARISOPRODOL (SOMA) 350 MG TABLET    Take 350 mg by mouth 2 (two) times daily as needed.    CYANOCOBALAMIN 1,000 MCG/ML INJECTION    Inject 1 mL (1,000 mcg total) into the skin once a week.    LINACLOTIDE (LINZESS) 290 MCG CAP CAPSULE    Take 1 capsule (290 mcg total) by mouth daily before breakfast.    LUBIPROSTONE (AMITIZA) 24 MCG CAP    Take 1 capsule (24 mcg total) by mouth 2 (two) times daily.    NURTEC 75 MG ODT    Take 75 mg by mouth daily as needed.    ONDANSETRON (ZOFRAN-ODT) 8 MG TBDL    Dissolve 1 tablet (8 mg total) by mouth 2 (two) times daily.    PROMETHAZINE (PHENERGAN) 25 MG TABLET    Take 25 mg by mouth every 8 (eight) hours as needed.    TIRZEPATIDE 10 MG/0.5 ML PNIJ    Inject 10 mg into the skin every 7 days.    TIZANIDINE (ZANAFLEX) 4 MG TABLET    TAKE 1 TABLET BY MOUTH TWICE DAILY AS NEEDED    TRAZODONE (DESYREL) 100 MG TABLET    TAKE 2-3 TABLETS BY MOUTH AT BEDTIME    VALACYCLOVIR (VALTREX) 500 MG TABLET    Take 1 tablet (500 mg total) by mouth every 12 (twelve) hours.    ZOLPIDEM (AMBIEN CR) 12.5 MG CR TABLET    Take 1 tablet (12.5 mg total) by mouth nightly as needed for Insomnia.         ASSESSMENT AND PLAN:    Problem List Items Addressed This Visit    None  Visit Diagnoses       Therapeutic opioid-induced constipation (OIC)    -  Primary    Relevant Medications    naloxegoL (MOVANTIK) 25 mg tablet    dicyclomine (BENTYL) 20 mg tablet          Very difficult  Potential medication side effects were discussed with the patient; let me know if any occur.  Monitor closely      Future Appointments   Date Time Provider Department Center   12/1/2023  9:20 AM Merrill Cummins MD [facility]       Follow up in about 4 weeks (around 11/23/2023) for assess treatment plan. or sooner as needed.

## 2023-12-28 DIAGNOSIS — F51.01 PRIMARY INSOMNIA: ICD-10-CM

## 2023-12-28 NOTE — TELEPHONE ENCOUNTER
Care Due:                  Date            Visit Type   Department     Provider  --------------------------------------------------------------------------------                                ESTABLISHED   [facility]                              PATIENT -    MEDICINE/INTERN  Last Visit: 10-      [facility]         Merrill Cummins  Next Visit: None Scheduled  None         None Found                                                            Last  Test          Frequency    Reason                     Performed    Due Date  --------------------------------------------------------------------------------    HBA1C.......  6 months...  tirzepatide..............  10-   04-    [facility] Embedded Care Due Messages. Reference number: [id number].   12/28/2023 3:28:08 PM CST

## 2023-12-29 RX ORDER — ZOLPIDEM TARTRATE 12.5 MG/1
12.5 TABLET, FILM COATED, EXTENDED RELEASE ORAL NIGHTLY PRN
Qty: 30 TABLET | Refills: 2 | Status: SHIPPED | OUTPATIENT
Start: 2023-12-29 | End: 2024-06-28

## 2023-12-30 ENCOUNTER — PATIENT MESSAGE (OUTPATIENT)
Dept: ADMINISTRATIVE | Facility: HOSPITAL | Age: 53
End: 2023-12-30
Payer: COMMERCIAL

## 2023-12-31 DIAGNOSIS — Z12.11 SCREENING FOR COLON CANCER: ICD-10-CM

## 2024-02-02 DIAGNOSIS — E11.69 TYPE 2 DIABETES MELLITUS WITH OTHER SPECIFIED COMPLICATION, WITHOUT LONG-TERM CURRENT USE OF INSULIN: ICD-10-CM

## 2024-02-02 RX ORDER — TIRZEPATIDE 12.5 MG/.5ML
12.5 INJECTION, SOLUTION SUBCUTANEOUS
Qty: 12 PEN | Refills: 3 | Status: SHIPPED | OUTPATIENT
Start: 2024-02-02 | End: 2024-03-11

## 2024-02-05 DIAGNOSIS — E11.69 TYPE 2 DIABETES MELLITUS WITH OTHER SPECIFIED COMPLICATION, WITHOUT LONG-TERM CURRENT USE OF INSULIN: ICD-10-CM

## 2024-02-05 RX ORDER — TIRZEPATIDE 15 MG/.5ML
15 INJECTION, SOLUTION SUBCUTANEOUS
Qty: 12 PEN | Refills: 3 | Status: SHIPPED | OUTPATIENT
Start: 2024-02-05

## 2024-02-20 LAB — HEMOCCULT STL QL IA: NEGATIVE

## 2024-03-11 ENCOUNTER — OFFICE VISIT (OUTPATIENT)
Dept: PAIN MEDICINE | Facility: CLINIC | Age: 54
End: 2024-03-11
Payer: COMMERCIAL

## 2024-03-11 VITALS
SYSTOLIC BLOOD PRESSURE: 133 MMHG | DIASTOLIC BLOOD PRESSURE: 70 MMHG | HEART RATE: 74 BPM | RESPIRATION RATE: 18 BRPM | OXYGEN SATURATION: 99 %

## 2024-03-11 DIAGNOSIS — M47.812 CERVICAL SPONDYLOSIS: Primary | ICD-10-CM

## 2024-03-11 DIAGNOSIS — M79.18 MYOFASCIAL PAIN: ICD-10-CM

## 2024-03-11 DIAGNOSIS — M50.30 DDD (DEGENERATIVE DISC DISEASE), CERVICAL: ICD-10-CM

## 2024-03-11 PROCEDURE — 1159F MED LIST DOCD IN RCRD: CPT | Mod: CPTII,S$GLB,, | Performed by: PAIN MEDICINE

## 2024-03-11 PROCEDURE — 3075F SYST BP GE 130 - 139MM HG: CPT | Mod: CPTII,S$GLB,, | Performed by: PAIN MEDICINE

## 2024-03-11 PROCEDURE — 99214 OFFICE O/P EST MOD 30 MIN: CPT | Mod: 25,S$GLB,, | Performed by: PAIN MEDICINE

## 2024-03-11 PROCEDURE — 3078F DIAST BP <80 MM HG: CPT | Mod: CPTII,S$GLB,, | Performed by: PAIN MEDICINE

## 2024-03-11 PROCEDURE — 99999 PR PBB SHADOW E&M-EST. PATIENT-LVL IV: CPT | Mod: PBBFAC,,, | Performed by: PAIN MEDICINE

## 2024-03-11 PROCEDURE — 1160F RVW MEDS BY RX/DR IN RCRD: CPT | Mod: CPTII,S$GLB,, | Performed by: PAIN MEDICINE

## 2024-03-11 PROCEDURE — 20553 NJX 1/MLT TRIGGER POINTS 3/>: CPT | Mod: S$GLB,,, | Performed by: PAIN MEDICINE

## 2024-03-11 RX ORDER — TRIAMCINOLONE ACETONIDE 40 MG/ML
40 INJECTION, SUSPENSION INTRA-ARTICULAR; INTRAMUSCULAR
Status: COMPLETED | OUTPATIENT
Start: 2024-03-11 | End: 2024-03-11

## 2024-03-11 RX ADMIN — TRIAMCINOLONE ACETONIDE 40 MG: 40 INJECTION, SUSPENSION INTRA-ARTICULAR; INTRAMUSCULAR at 07:03
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
## 2024-03-26 ENCOUNTER — TELEPHONE (OUTPATIENT)
Dept: FAMILY MEDICINE | Facility: CLINIC | Age: 54
End: 2024-03-26

## 2024-03-26 ENCOUNTER — PATIENT MESSAGE (OUTPATIENT)
Dept: FAMILY MEDICINE | Facility: CLINIC | Age: 54
End: 2024-03-26

## 2024-03-26 ENCOUNTER — LAB VISIT (OUTPATIENT)
Dept: LAB | Facility: HOSPITAL | Age: 54
End: 2024-03-26
Attending: FAMILY MEDICINE
Payer: COMMERCIAL

## 2024-03-26 ENCOUNTER — OFFICE VISIT (OUTPATIENT)
Dept: FAMILY MEDICINE | Facility: CLINIC | Age: 54
End: 2024-03-26
Payer: COMMERCIAL

## 2024-03-26 VITALS
TEMPERATURE: 98 F | HEIGHT: 65 IN | DIASTOLIC BLOOD PRESSURE: 76 MMHG | SYSTOLIC BLOOD PRESSURE: 118 MMHG | HEART RATE: 75 BPM | OXYGEN SATURATION: 98 % | WEIGHT: 152.75 LBS | BODY MASS INDEX: 25.45 KG/M2

## 2024-03-26 DIAGNOSIS — Z12.31 OTHER SCREENING MAMMOGRAM: ICD-10-CM

## 2024-03-26 DIAGNOSIS — F51.01 PRIMARY INSOMNIA: ICD-10-CM

## 2024-03-26 DIAGNOSIS — E11.9 TYPE 2 DIABETES MELLITUS WITHOUT COMPLICATION, WITHOUT LONG-TERM CURRENT USE OF INSULIN: ICD-10-CM

## 2024-03-26 DIAGNOSIS — Z00.00 ANNUAL PHYSICAL EXAM: Primary | ICD-10-CM

## 2024-03-26 PROCEDURE — 99396 PREV VISIT EST AGE 40-64: CPT | Mod: S$GLB,,, | Performed by: FAMILY MEDICINE

## 2024-03-26 PROCEDURE — 81003 URINALYSIS AUTO W/O SCOPE: CPT | Performed by: FAMILY MEDICINE

## 2024-03-26 PROCEDURE — 82043 UR ALBUMIN QUANTITATIVE: CPT | Performed by: FAMILY MEDICINE

## 2024-03-26 PROCEDURE — 99999 PR PBB SHADOW E&M-EST. PATIENT-LVL III: CPT | Mod: PBBFAC,,, | Performed by: FAMILY MEDICINE

## 2024-03-26 PROCEDURE — 3074F SYST BP LT 130 MM HG: CPT | Mod: CPTII,S$GLB,, | Performed by: FAMILY MEDICINE

## 2024-03-26 PROCEDURE — 3008F BODY MASS INDEX DOCD: CPT | Mod: CPTII,S$GLB,, | Performed by: FAMILY MEDICINE

## 2024-03-26 PROCEDURE — 3078F DIAST BP <80 MM HG: CPT | Mod: CPTII,S$GLB,, | Performed by: FAMILY MEDICINE

## 2024-03-26 RX ORDER — ZOLPIDEM TARTRATE 10 MG/1
10 TABLET ORAL NIGHTLY PRN
Qty: 90 TABLET | Refills: 1 | Status: SHIPPED | OUTPATIENT
Start: 2024-03-26 | End: 2024-09-24

## 2024-03-26 RX ORDER — SEMAGLUTIDE 2.68 MG/ML
2 INJECTION, SOLUTION SUBCUTANEOUS
Qty: 3 ML | Refills: 11 | Status: SHIPPED | OUTPATIENT
Start: 2024-03-26 | End: 2025-03-26

## 2024-03-26 RX ORDER — ALBUTEROL SULFATE 90 UG/1
2 AEROSOL, METERED RESPIRATORY (INHALATION) EVERY 6 HOURS PRN
Qty: 8.5 G | Refills: 2 | Status: SHIPPED | OUTPATIENT
Start: 2024-03-26 | End: 2025-03-26

## 2024-03-26 NOTE — TELEPHONE ENCOUNTER
----- Message from Sean Cruz sent at 3/26/2024  3:19 PM CDT -----  Regarding: self  Type: Patient Call Back    Who called:self    What is the request in detail:calling to speak with ena in this office     Can the clinic reply by [facility]?no    Would the patient rather a call back or a response via [facility]? callback    Best call back number:[phone]    Additional Information:

## 2024-03-26 NOTE — PROGRESS NOTES
Chief Complaint   Patient presents with    Medication Refill       SUBJECTIVE:   53 y.o. female for annual routine checkup.    Current Outpatient Medications   Medication Sig Dispense Refill    albuterol (VENTOLIN HFA) 90 mcg/actuation inhaler Inhale 2 puffs into the lungs every 6 (six) hours as needed for Wheezing. Rescue 8.5 g 2    carBAMazepine (TEGRETOL) 200 mg tablet Take 3.5 tablets by mouth every 12 hours for 30 days. 210 tablet 0    carisoprodoL (SOMA) 350 MG tablet Take 350 mg by mouth 2 (two) times daily as needed.      cyanocobalamin 1,000 mcg/mL injection Inject 1 mL (1,000 mcg total) into the skin once a week. 10 mL 1    linaCLOtide (LINZESS) 290 mcg Cap capsule Take 1 capsule (290 mcg total) by mouth daily before breakfast. 90 capsule 3    lubiprostone (AMITIZA) 24 MCG Cap Take 1 capsule (24 mcg total) by mouth 2 (two) times daily. 90 capsule 3    naloxegoL (MOVANTIK) 25 mg tablet Take 1 tablet (25 mg) by mouth once daily. 30 tablet 0    NURTEC 75 mg odt Take 75 mg by mouth daily as needed.      ondansetron (ZOFRAN-ODT) 8 MG TbDL Dissolve 1 tablet (8 mg total) by mouth 2 (two) times daily. 20 tablet 11    promethazine (PHENERGAN) 25 MG tablet Take 25 mg by mouth every 8 (eight) hours as needed.      semaglutide (OZEMPIC) 2 mg/dose (8 mg/3 mL) PnIj Inject 2 mg into the skin every 7 days. 3 mL 11    tirzepatide (MOUNJARO) 15 mg/0.5 mL PnIj Inject 15 mg into the skin every 7 days. 12 Pen 3    tiZANidine (ZANAFLEX) 4 MG tablet TAKE 1 TABLET BY MOUTH TWICE DAILY AS NEEDED 60 tablet 5    traZODone (DESYREL) 100 MG tablet TAKE 2-3 TABLETS BY MOUTH AT BEDTIME 90 tablet 5    valACYclovir (VALTREX) 500 MG tablet Take 1 tablet (500 mg total) by mouth every 12 (twelve) hours. 180 tablet 3    zolpidem (AMBIEN CR) 12.5 MG CR tablet Take 1 tablet (12.5 mg total) by mouth nightly as needed for Insomnia. 30 tablet 2    zolpidem (AMBIEN) 10 mg Tab Take 1 tablet (10 mg total) by mouth nightly as needed (insomnia). 90  "tablet 1     No current facility-administered medications for this visit.     Allergies: Amoxicillin, Ketorolac, and Morphine sulfate   No LMP recorded. Patient has had a hysterectomy.    ROS:  Feeling well. No dyspnea or chest pain on exertion.  No abdominal pain, change in bowel habits, black or bloody stools.  No urinary tract symptoms. GYN ROS: deferred. No neurological complaints.  Review of Systems   HENT:  Negative for hearing loss.    Eyes:  Negative for discharge.   Respiratory:  Positive for wheezing.    Cardiovascular:  Negative for chest pain and palpitations.   Gastrointestinal:  Positive for constipation. Negative for blood in stool, diarrhea and vomiting.   Genitourinary:  Negative for dysuria and hematuria.   Musculoskeletal:  Positive for neck pain.   Neurological:  Positive for headaches. Negative for weakness.   Endo/Heme/Allergies:  Negative for polydipsia.       OBJECTIVE:   The patient appears well, alert, oriented x 3, in no distress.  /76   Pulse 75   Temp 97.7 °F (36.5 °C) (Oral)   Ht 5' 5" (1.651 m)   Wt 69.3 kg (152 lb 12.5 oz)   SpO2 98%   BMI 25.42 kg/m²   Wt Readings from Last 5 Encounters:   03/26/24 69.3 kg (152 lb 12.5 oz)   09/25/23 70.4 kg (155 lb 3.3 oz)   07/28/23 72 kg (158 lb 11.7 oz)   06/29/23 76.2 kg (168 lb)   05/10/23 76 kg (167 lb 8.8 oz)       ENT normal.  Neck supple. No adenopathy or thyromegaly. MILADIS. Lungs are clear, good air entry, no wheezes, rhonchi or rales. S1 and S2 normal, no murmurs, regular rate and rhythm. Abdomen soft without tenderness, guarding, mass or organomegaly. Extremities show no edema, normal peripheral pulses. Neurological is normal, no focal findings.      BREAST EXAM: deferred    PELVIC EXAM: deferred    ASSESSMENT:   1. Annual physical exam    2. Type 2 diabetes mellitus without complication, without long-term current use of insulin    3. Primary insomnia    4. Other screening mammogram          PLAN:   Counseled on age " appropriate medical preventative services, including age appropriate cancer screenings, over all nutritional health, need for a consistent exercise regimen and an over all push towards maintaining a vigorous and active lifestyle.  Counseled on age appropriate vaccines and discussed upcoming health care needs based on age/gender.  Spent time with patient counseling on need for a good patient/doctor relationship moving forward.  Discussed use of common OTC medications and supplements.  Discussed common dietary aids and use of caffeine and the need for good sleep hygiene and stress management.    Problem List Items Addressed This Visit       Primary insomnia    Relevant Medications    zolpidem (AMBIEN) 10 mg Tab    Diabetes mellitus, type 2    Overview     Procedures    Procedures  Date Name Performed by       Appendectomy Information not available      Gallbladder Surgery Information not available      Neck Surgery Information not available      Orthopedic Surgery Information not available      Tonsillectomy/Adenoidectomy Information not available      Ankle/Foot Surgery  Notes:  achilles tendon Information not available      Cholecystectomy Information not available      Gastrointestinal Surgery  Notes:  gastric sleeve Information not available      Back Surgery Information not available      Hip Surgery Information not available    07/14/2015 MRI, Brain [address] [place] [address] [phone] ([facility])    03/16/2016 Eeg Information not available    03/16/2016 MRI, Brain, Ms Protocol Information not available    01/12/2021 MRI, Brain, W/wo Contrast [facility] [address] [address] [phone] ([facility])      H/o gastric sleeve and GLP-1 agonist  A1C very controlled  Lab Results   Component Value Date    HGBA1C 5.0 10/19/2022     Lab Results   Component Value Date    LDLCALC 138.6 10/19/2022    CREATININE 0.8 10/19/2022              Relevant Medications     semaglutide (OZEMPIC) 2 mg/dose (8 mg/3 mL) PnIj    Other Relevant Orders    Hemoglobin A1C    Microalbumin/Creatinine Ratio, Urine    PTH, Intact (Completed)    Lipid Panel (Completed)    CBC Auto Differential (Completed)    Comprehensive Metabolic Panel (Completed)    Urinalysis     Other Visit Diagnoses       Annual physical exam    -  Primary    Other screening mammogram        Relevant Orders    Mammo Digital Screening Bilat w/ Sean            F/u in 1 year for wellness

## 2024-03-27 LAB
ALBUMIN/CREAT UR: 9.2 UG/MG (ref 0–30)
BILIRUB UR QL STRIP: NEGATIVE
CLARITY UR: CLEAR
COLOR UR: YELLOW
CREAT UR-MCNC: 131 MG/DL (ref 15–325)
GLUCOSE UR QL STRIP: NEGATIVE
HGB UR QL STRIP: NEGATIVE
KETONES UR QL STRIP: NEGATIVE
LEUKOCYTE ESTERASE UR QL STRIP: NEGATIVE
MICROALBUMIN UR DL<=1MG/L-MCNC: 12 UG/ML
NITRITE UR QL STRIP: NEGATIVE
PH UR STRIP: 6 [PH] (ref 5–8)
PROT UR QL STRIP: NEGATIVE
SP GR UR STRIP: 1.02 (ref 1–1.03)
URN SPEC COLLECT METH UR: NORMAL
UROBILINOGEN UR STRIP-ACNC: NEGATIVE EU/DL

## 2024-04-09 DIAGNOSIS — J45.909 ASTHMA, UNSPECIFIED ASTHMA SEVERITY, UNSPECIFIED WHETHER COMPLICATED, UNSPECIFIED WHETHER PERSISTENT: Primary | ICD-10-CM

## 2024-04-16 ENCOUNTER — TELEPHONE (OUTPATIENT)
Dept: FAMILY MEDICINE | Facility: CLINIC | Age: 54
End: 2024-04-16
Payer: COMMERCIAL

## 2024-04-17 DIAGNOSIS — E11.65 TYPE 2 DIABETES MELLITUS WITH HYPERGLYCEMIA, WITHOUT LONG-TERM CURRENT USE OF INSULIN: Primary | ICD-10-CM

## 2024-06-11 DIAGNOSIS — R11.0 NAUSEA: ICD-10-CM

## 2024-06-11 RX ORDER — ONDANSETRON 8 MG/1
8 TABLET, ORALLY DISINTEGRATING ORAL 2 TIMES DAILY
Qty: 20 TABLET | Refills: 11 | Status: SHIPPED | OUTPATIENT
Start: 2024-06-11

## 2024-06-11 NOTE — TELEPHONE ENCOUNTER
No care due was identified.  Health [facility] Embedded Care Due Messages. Reference number: [id number].   6/11/2024 12:30:26 PM CDT

## 2024-06-11 NOTE — TELEPHONE ENCOUNTER
Refill Routing Note   Medication(s) are not appropriate for processing by [facility] for the following reason(s):        Outside of protocol    [facility] action(s):  Route               Appointments  past 12m or future 3m with PCP    Date Provider   Last Visit   3/26/2024 Merrill Cummins MD   Next Visit   6/24/2024 Merrill Cummins MD   ED visits in past 90 days: 0        Note composed:12:38 PM 06/11/2024

## 2024-07-02 ENCOUNTER — HOSPITAL ENCOUNTER (EMERGENCY)
Facility: HOSPITAL | Age: 54
Discharge: HOME | End: 2024-07-02
Attending: EMERGENCY MEDICINE
Payer: MEDICARE

## 2024-07-02 VITALS
TEMPERATURE: 98 F | BODY MASS INDEX: 24.96 KG/M2 | HEART RATE: 77 BPM | SYSTOLIC BLOOD PRESSURE: 121 MMHG | RESPIRATION RATE: 17 BRPM | OXYGEN SATURATION: 98 % | DIASTOLIC BLOOD PRESSURE: 70 MMHG | WEIGHT: 150 LBS

## 2024-07-02 DIAGNOSIS — S39.012A LUMBAR STRAIN, INITIAL ENCOUNTER: Primary | ICD-10-CM

## 2024-07-02 PROCEDURE — 82962 GLUCOSE BLOOD TEST: CPT

## 2024-07-02 PROCEDURE — 99285 EMERGENCY DEPT VISIT HI MDM: CPT | Mod: 25

## 2024-07-02 PROCEDURE — 25000003 PHARM REV CODE 250: Performed by: PHYSICIAN ASSISTANT

## 2024-07-02 PROCEDURE — 96372 THER/PROPH/DIAG INJ SC/IM: CPT | Performed by: PHYSICIAN ASSISTANT

## 2024-07-02 PROCEDURE — 63600175 PHARM REV CODE 636 W HCPCS: Performed by: PHYSICIAN ASSISTANT

## 2024-07-02 RX ORDER — DIAZEPAM 2 MG/1
2 TABLET ORAL 2 TIMES DAILY PRN
Qty: 8 TABLET | Refills: 0 | Status: SHIPPED | OUTPATIENT
Start: 2024-07-02 | End: 2024-07-07

## 2024-07-02 RX ORDER — DIAZEPAM 2 MG/1
2 TABLET ORAL 2 TIMES DAILY PRN
Qty: 8 TABLET | Refills: 0 | Status: SHIPPED | OUTPATIENT
Start: 2024-07-02 | End: 2024-07-02

## 2024-07-02 RX ORDER — METHYLPREDNISOLONE 4 MG/1
TABLET ORAL
Qty: 21 EACH | Refills: 0 | Status: SHIPPED | OUTPATIENT
Start: 2024-07-02 | End: 2024-07-23

## 2024-07-02 RX ORDER — DEXAMETHASONE SODIUM PHOSPHATE 4 MG/ML
12 INJECTION, SOLUTION INTRA-ARTICULAR; INTRALESIONAL; INTRAMUSCULAR; INTRAVENOUS; SOFT TISSUE
Status: COMPLETED | OUTPATIENT
Start: 2024-07-02 | End: 2024-07-02

## 2024-07-02 RX ORDER — DIAZEPAM 2 MG/1
2 TABLET ORAL
Status: COMPLETED | OUTPATIENT
Start: 2024-07-02 | End: 2024-07-02

## 2024-07-02 RX ORDER — METHYLPREDNISOLONE 4 MG/1
TABLET ORAL
Qty: 21 EACH | Refills: 0 | Status: SHIPPED | OUTPATIENT
Start: 2024-07-02 | End: 2024-07-02

## 2024-07-02 RX ADMIN — DIAZEPAM 2 MG: 2 TABLET ORAL at 08:07

## 2024-07-02 RX ADMIN — DEXAMETHASONE SODIUM PHOSPHATE 12 MG: 4 INJECTION INTRA-ARTICULAR; INTRALESIONAL; INTRAMUSCULAR; INTRAVENOUS; SOFT TISSUE at 08:07

## 2024-07-03 LAB — POCT GLUCOSE: 80 MG/DL (ref 70–110)

## 2024-07-03 NOTE — DISCHARGE INSTRUCTIONS
Ice, heating pad to the area to help relax the area, to see if any improvement of stiffness/soreness.  Begin taking ibuprofen 2-3 times daily, or similar NSAID as needed for pain.  Valium only as needed for severe stiffness and soreness. Be aware, this medication is sedating.  Do not mix with alcohol or any other sedating medications.  Do not drive or operate machinery when taking this medication.     Follow-up with neurosurgery as planned.  Contact your primary care provider for follow-up and repeat exam of any persistent symptoms.    Return to this ED if you develop leg weakness, if you have difficulty with urination or bowel movements, if unable to tolerate pain, if unable to walk or bear weight, if any other problems occur.
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
## 2024-07-29 ENCOUNTER — PATIENT MESSAGE (OUTPATIENT)
Dept: FAMILY MEDICINE | Facility: CLINIC | Age: 54
End: 2024-07-29
Payer: MEDICARE

## 2024-07-30 DIAGNOSIS — F51.01 PRIMARY INSOMNIA: ICD-10-CM

## 2024-07-30 RX ORDER — ZOLPIDEM TARTRATE 12.5 MG/1
12.5 TABLET, FILM COATED, EXTENDED RELEASE ORAL NIGHTLY PRN
Qty: 30 TABLET | Refills: 2 | Status: SHIPPED | OUTPATIENT
Start: 2024-07-30 | End: 2025-01-28

## 2024-07-30 RX ORDER — VALACYCLOVIR HYDROCHLORIDE 500 MG/1
500 TABLET, FILM COATED ORAL EVERY 12 HOURS
Qty: 180 TABLET | Refills: 3 | Status: SHIPPED | OUTPATIENT
Start: 2024-07-30 | End: 2025-07-30

## 2024-07-30 NOTE — TELEPHONE ENCOUNTER
Care Due:                  Date            Visit Type   Department     Provider  --------------------------------------------------------------------------------                                AKUA      [facility]/OF  MEDICINE/INTERN  Last Visit: 03-      STARLAE NATANAEL   [facility]         Merrill Cummins  Next Visit: None Scheduled  None         None Found                                                            Last  Test          Frequency    Reason                     Performed    Due Date  --------------------------------------------------------------------------------    HBA1C.......  6 months...  semaglutide, tirzepatide.  03- 09-    [facility] Embedded Care Due Messages. Reference number: [id number].   7/30/2024 7:03:38 AM CDT   Is This A New Presentation, Or A Follow-Up?: Skin Lesion How Severe Is Your Skin Lesion?: mild Has Your Skin Lesion Been Treated?: not been treated

## 2024-07-30 NOTE — TELEPHONE ENCOUNTER
No care due was identified.  [facility] Embedded Care Due Messages. Reference number: [id number].   7/30/2024 9:04:44 AM CDT

## 2024-08-22 DIAGNOSIS — R11.0 NAUSEA: ICD-10-CM

## 2024-08-22 DIAGNOSIS — R79.9 ABNORMAL FINDING OF BLOOD CHEMISTRY: ICD-10-CM

## 2024-08-22 DIAGNOSIS — K59.04 CHRONIC IDIOPATHIC CONSTIPATION: ICD-10-CM

## 2024-08-22 RX ORDER — CYANOCOBALAMIN 1000 UG/ML
1000 INJECTION, SOLUTION INTRAMUSCULAR; SUBCUTANEOUS WEEKLY
Qty: 10 ML | Refills: 1 | Status: SHIPPED | OUTPATIENT
Start: 2024-08-22

## 2024-08-22 NOTE — TELEPHONE ENCOUNTER
Refill Routing Note   Medication(s) are not appropriate for processing by [facility] for the following reason(s):        Outside of protocol    [facility] action(s):  Route             Appointments  past 12m or future 3m with PCP    Date Provider   Last Visit   3/26/2024 Merrill Cummins MD   Next Visit   Visit date not found Merrill Cummins MD   ED visits in past 90 days: 1        Note composed:12:16 PM 08/22/2024

## 2024-08-29 ENCOUNTER — PATIENT MESSAGE (OUTPATIENT)
Dept: FAMILY MEDICINE | Facility: CLINIC | Age: 54
End: 2024-08-29
Payer: MEDICARE

## 2024-08-29 DIAGNOSIS — F51.01 PRIMARY INSOMNIA: ICD-10-CM

## 2024-08-30 NOTE — TELEPHONE ENCOUNTER
No care due was identified.  [facility] Embedded Care Due Messages. Reference number: [id number].   8/30/2024 7:55:55 AM CDT

## 2024-08-31 RX ORDER — ZOLPIDEM TARTRATE 12.5 MG/1
12.5 TABLET, FILM COATED, EXTENDED RELEASE ORAL NIGHTLY PRN
Qty: 90 TABLET | Refills: 0 | Status: SHIPPED | OUTPATIENT
Start: 2024-09-24 | End: 2025-03-25

## 2024-11-01 ENCOUNTER — PATIENT MESSAGE (OUTPATIENT)
Dept: FAMILY MEDICINE | Facility: CLINIC | Age: 54
End: 2024-11-01
Payer: MEDICARE

## 2024-11-04 DIAGNOSIS — E11.65 TYPE 2 DIABETES MELLITUS WITH HYPERGLYCEMIA, WITHOUT LONG-TERM CURRENT USE OF INSULIN: Primary | ICD-10-CM

## 2024-11-15 DIAGNOSIS — E11.9 TYPE 2 DIABETES MELLITUS WITHOUT COMPLICATION, WITHOUT LONG-TERM CURRENT USE OF INSULIN: Primary | ICD-10-CM

## 2024-11-18 ENCOUNTER — TELEPHONE (OUTPATIENT)
Dept: FAMILY MEDICINE | Facility: CLINIC | Age: 54
End: 2024-11-18
Payer: MEDICARE

## 2024-11-18 NOTE — TELEPHONE ENCOUNTER
Spoke w/ pharmacy. Clarified with Dr Cummins.  Rx is tirzepatide 20mg/ml. Inject 25 units SQ weekly = 5mg/week for 3 months.    Spoke w/ PT. Per Dr Cummins, PT is to inject 50 units SQ weekly = 10mg for 1 1/2 months. PT verbalized understanding.

## 2024-11-18 NOTE — TELEPHONE ENCOUNTER
----- Message from Danika sent at 11/18/2024  3:15 PM CST -----  Regarding: tirzepatide 15 mg/0.5 mL PnIj  Type:  Pharmacy Calling to Clarify an RX    Name of Caller:Pharmacy tech  Pharmacy Name:[facility]   Prescription Name:tirzepatide 15 mg/0.5 mL PnIj  What do they need to clarify?:dosage/compound  Best Call Back Number:  Additional Information: wants to know why she is getting compounded instead of commercial Monjuro. Patient was on 50 unit but script is starting her at 10, patient wants to know why she has to start over

## 2025-03-28 ENCOUNTER — PATIENT MESSAGE (OUTPATIENT)
Dept: FAMILY MEDICINE | Facility: CLINIC | Age: 55
End: 2025-03-28
Payer: MEDICARE

## 2025-03-31 ENCOUNTER — PATIENT MESSAGE (OUTPATIENT)
Dept: ADMINISTRATIVE | Facility: HOSPITAL | Age: 55
End: 2025-03-31
Payer: MEDICARE

## 2025-04-02 VITALS — SYSTOLIC BLOOD PRESSURE: 122 MMHG | DIASTOLIC BLOOD PRESSURE: 73 MMHG

## 2025-04-23 ENCOUNTER — PATIENT MESSAGE (OUTPATIENT)
Dept: FAMILY MEDICINE | Facility: CLINIC | Age: 55
End: 2025-04-23
Payer: MEDICARE

## 2025-04-23 VITALS — SYSTOLIC BLOOD PRESSURE: 113 MMHG | DIASTOLIC BLOOD PRESSURE: 74 MMHG

## 2025-04-30 DIAGNOSIS — I10 HYPERTENSIVE DISORDER: ICD-10-CM

## 2025-06-30 ENCOUNTER — OFFICE VISIT (OUTPATIENT)
Dept: FAMILY MEDICINE | Facility: CLINIC | Age: 55
End: 2025-06-30
Payer: MEDICARE

## 2025-06-30 VITALS — HEIGHT: 65 IN | BODY MASS INDEX: 22.82 KG/M2 | WEIGHT: 137 LBS

## 2025-06-30 DIAGNOSIS — Z12.11 ENCOUNTER FOR COLORECTAL CANCER SCREENING: Primary | ICD-10-CM

## 2025-06-30 DIAGNOSIS — Z12.12 ENCOUNTER FOR COLORECTAL CANCER SCREENING: Primary | ICD-10-CM

## 2025-06-30 DIAGNOSIS — E11.65 TYPE 2 DIABETES MELLITUS WITH HYPERGLYCEMIA, WITHOUT LONG-TERM CURRENT USE OF INSULIN: ICD-10-CM

## 2025-06-30 PROCEDURE — 98006 SYNCH AUDIO-VIDEO EST MOD 30: CPT | Mod: 95,,, | Performed by: FAMILY MEDICINE

## 2025-06-30 PROCEDURE — G2211 COMPLEX E/M VISIT ADD ON: HCPCS | Mod: 95,,, | Performed by: FAMILY MEDICINE
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
## 2025-07-01 ENCOUNTER — PATIENT MESSAGE (OUTPATIENT)
Dept: ADMINISTRATIVE | Facility: HOSPITAL | Age: 55
End: 2025-07-01
Payer: MEDICARE

## 2025-07-02 DIAGNOSIS — E11.9 TYPE 2 DIABETES MELLITUS WITHOUT COMPLICATION, UNSPECIFIED WHETHER LONG TERM INSULIN USE: ICD-10-CM

## 2025-07-02 DIAGNOSIS — E11.9 TYPE 2 DIABETES MELLITUS WITHOUT COMPLICATION: ICD-10-CM

## 2025-08-06 ENCOUNTER — PATIENT MESSAGE (OUTPATIENT)
Dept: FAMILY MEDICINE | Facility: CLINIC | Age: 55
End: 2025-08-06
Payer: MEDICARE